# Patient Record
Sex: FEMALE | Race: WHITE | Employment: FULL TIME | URBAN - METROPOLITAN AREA
[De-identification: names, ages, dates, MRNs, and addresses within clinical notes are randomized per-mention and may not be internally consistent; named-entity substitution may affect disease eponyms.]

---

## 2019-05-07 ENCOUNTER — APPOINTMENT (OUTPATIENT)
Dept: GENERAL RADIOLOGY | Age: 34
End: 2019-05-07
Payer: COMMERCIAL

## 2019-05-07 ENCOUNTER — HOSPITAL ENCOUNTER (EMERGENCY)
Age: 34
Discharge: HOME | End: 2019-05-07
Payer: COMMERCIAL

## 2019-05-07 VITALS
HEIGHT: 61 IN | OXYGEN SATURATION: 98 % | SYSTOLIC BLOOD PRESSURE: 129 MMHG | HEART RATE: 95 BPM | BODY MASS INDEX: 26.43 KG/M2 | DIASTOLIC BLOOD PRESSURE: 82 MMHG | RESPIRATION RATE: 18 BRPM | WEIGHT: 140 LBS | TEMPERATURE: 98.2 F

## 2019-05-07 DIAGNOSIS — J40 BRONCHITIS: Primary | ICD-10-CM

## 2019-05-07 LAB
EKG ATRIAL RATE: 75 BPM
EKG DIAGNOSIS: NORMAL
EKG P AXIS: 4 DEGREES
EKG P-R INTERVAL: 152 MS
EKG Q-T INTERVAL: 384 MS
EKG QRS DURATION: 84 MS
EKG QTC CALCULATION (BAZETT): 428 MS
EKG R AXIS: 66 DEGREES
EKG T AXIS: 42 DEGREES
EKG VENTRICULAR RATE: 75 BPM

## 2019-05-07 PROCEDURE — 6370000000 HC RX 637 (ALT 250 FOR IP): Performed by: PHYSICIAN ASSISTANT

## 2019-05-07 PROCEDURE — 71046 X-RAY EXAM CHEST 2 VIEWS: CPT

## 2019-05-07 PROCEDURE — 93005 ELECTROCARDIOGRAM TRACING: CPT | Performed by: EMERGENCY MEDICINE

## 2019-05-07 PROCEDURE — 93010 ELECTROCARDIOGRAM REPORT: CPT | Performed by: INTERNAL MEDICINE

## 2019-05-07 PROCEDURE — 94640 AIRWAY INHALATION TREATMENT: CPT

## 2019-05-07 PROCEDURE — 6360000002 HC RX W HCPCS: Performed by: PHYSICIAN ASSISTANT

## 2019-05-07 PROCEDURE — 99284 EMERGENCY DEPT VISIT MOD MDM: CPT

## 2019-05-07 RX ORDER — DEXAMETHASONE 4 MG/1
10 TABLET ORAL ONCE
Status: COMPLETED | OUTPATIENT
Start: 2019-05-07 | End: 2019-05-07

## 2019-05-07 RX ORDER — METHYLPREDNISOLONE 4 MG/1
TABLET ORAL
Qty: 1 KIT | Refills: 0 | Status: SHIPPED | OUTPATIENT
Start: 2019-05-07 | End: 2019-09-13 | Stop reason: ALTCHOICE

## 2019-05-07 RX ORDER — ALBUTEROL SULFATE 90 UG/1
2 AEROSOL, METERED RESPIRATORY (INHALATION) 4 TIMES DAILY PRN
Qty: 1 INHALER | Refills: 5 | Status: SHIPPED | OUTPATIENT
Start: 2019-05-07

## 2019-05-07 RX ORDER — BENZONATATE 100 MG/1
100 CAPSULE ORAL 3 TIMES DAILY PRN
Qty: 15 CAPSULE | Refills: 0 | Status: SHIPPED | OUTPATIENT
Start: 2019-05-07 | End: 2019-05-10

## 2019-05-07 RX ORDER — IPRATROPIUM BROMIDE AND ALBUTEROL SULFATE 2.5; .5 MG/3ML; MG/3ML
1 SOLUTION RESPIRATORY (INHALATION) ONCE
Status: COMPLETED | OUTPATIENT
Start: 2019-05-07 | End: 2019-05-07

## 2019-05-07 RX ADMIN — IPRATROPIUM BROMIDE AND ALBUTEROL SULFATE 1 AMPULE: .5; 3 SOLUTION RESPIRATORY (INHALATION) at 15:57

## 2019-05-07 RX ADMIN — DEXAMETHASONE 10 MG: 4 TABLET ORAL at 16:30

## 2019-05-07 ASSESSMENT — PAIN DESCRIPTION - DESCRIPTORS: DESCRIPTORS: TIGHTNESS

## 2019-05-07 ASSESSMENT — PAIN DESCRIPTION - LOCATION: LOCATION: BACK

## 2019-05-07 ASSESSMENT — PAIN SCALES - GENERAL: PAINLEVEL_OUTOF10: 5

## 2019-05-07 ASSESSMENT — PAIN DESCRIPTION - PAIN TYPE: TYPE: ACUTE PAIN

## 2019-05-07 ASSESSMENT — PAIN DESCRIPTION - ORIENTATION: ORIENTATION: UPPER;MID

## 2019-05-07 ASSESSMENT — PAIN DESCRIPTION - FREQUENCY: FREQUENCY: CONTINUOUS

## 2019-05-07 NOTE — ED PROVIDER NOTES
[address] Emergency Department    CHIEF COMPLAINT  Shortness of Breath (hx of asthma, hurts to breathe, cough, pain between shoulder blades.  )      HISTORY OF PRESENT ILLNESS  Balta Story is a 34 y.o. female who presents to the ED complaining of one-week history of some cough and chest tightness. Patient observed lying in bed, appears nontoxic and in no acute distress at this time. Patient drove herself in today for evaluation. Patient with history of asthma. Is complaining of some chest discomfort when coughing. Cough is nonproductive. No hemoptysis. She is a nonsmoker. Denies fevers or chills. No neck, arm, jaw or back pain. Denies headache, lightheadedness, dizziness or confusion. No abdominal pain. No urinary symptoms. No unilateral calf pain or swelling. Denies exogenous estrogen use. No recent travel, trauma, or surgery. No other complaints, modifying factors or associated symptoms. Nursing notes reviewed.    Past Medical History:   Diagnosis Date    Asthma     Bleeding nose 13yo    s/p cauterization (left)    Migraines      Past Surgical History:   Procedure Laterality Date    ADENOIDECTOMY      KNEE SURGERY  1997    R knee    TYMPANOSTOMY TUBE PLACEMENT      child     Family History   Problem Relation Age of Onset    Diabetes Mother     Diabetes Father     High Blood Pressure Father      Social History     Socioeconomic History    Marital status:      Spouse name: Not on file    Number of children: Not on file    Years of education: Not on file    Highest education level: Not on file   Occupational History    Not on file   Social Needs    Financial resource strain: Not on file    Food insecurity:     Worry: Not on file     Inability: Not on file    Transportation needs:     Medical: Not on file     Non-medical: Not on file   Tobacco Use    Smoking status: Never Smoker    Smokeless tobacco: Never Used   Substance and Sexual Activity    Alcohol use: Yes     Comment: occas    Drug use: Yes     Types: Marijuana    Sexual activity: Yes     Partners: Male     Comment: uses condoms   Lifestyle    Physical activity:     Days per week: Not on file     Minutes per session: Not on file    Stress: Not on file   Relationships    Social connections:     Talks on phone: Not on file     Gets together: Not on file     Attends Faith service: Not on file     Active member of club or organization: Not on file     Attends meetings of clubs or organizations: Not on file     Relationship status: Not on file    Intimate partner violence:     Fear of current or ex partner: Not on file     Emotionally abused: Not on file     Physically abused: Not on file     Forced sexual activity: Not on file   Other Topics Concern    Not on file   Social History Narrative    Not on file     No current facility-administered medications for this encounter. Current Outpatient Medications   Medication Sig Dispense Refill    albuterol sulfate  (90 Base) MCG/ACT inhaler Inhale 2 puffs into the lungs 4 times daily as needed for Wheezing 1 Inhaler 5    methylPREDNISolone (MEDROL, SHANNON,) 4 MG tablet Take by mouth. 1 kit 0     Allergies   Allergen Reactions    Allegra [Fexofenadine Hydrochloride]      vomiting    Amoxicillin Rash    Ceclor [Cefaclor] Nausea And Vomiting       REVIEW OF SYSTEMS  10 systems reviewed, pertinent positives per HPI otherwise noted to be negative    PHYSICAL EXAM  /82   Pulse 87   Temp 98.2 °F (36.8 °C) (Oral)   Resp 16   Ht 5' 1\" (1.549 m)   Wt 140 lb (63.5 kg)   LMP 04/23/2019   SpO2 98%   BMI 26.45 kg/m²   GENERAL APPEARANCE: Awake and alert. Cooperative. No acute distress. HEAD: Normocephalic. Atraumatic. EYES: PERRL. EOM's grossly intact. ENT: Mucous membranes are moist.   NECK: Supple. No JVD. No tracheal tenderness or deviation. No crepitus. HEART: RRR. No murmurs. No chest wall tenderness.   LUNGS: Respirations unlabored. CTAB. Good air exchange. Speaking comfortably in full sentences. No obvious wheezing, rhonchi, rales. ABDOMEN: Soft. Non-distended. Non-tender. No guarding or rebound. No midline pulsatile mass. EXTREMITIES: No peripheral edema. No unilateral calf pain, redness, or swelling. Moves all extremities equally. All extremities neurovascularly intact. SKIN: Warm and dry. No acute rashes. NEUROLOGICAL: Alert and oriented. CN's 2-12 intact. No gross facial drooping. Strength 5/5, sensation intact. PSYCHIATRIC: Normal mood and affect. RADIOLOGY  Xr Chest Standard (2 Vw)    Result Date: 5/7/2019  EXAMINATION: TWO VIEWS OF THE CHEST 5/7/2019 3:04 pm COMPARISON: Chest radiograph and CT chest September 19, 2010 HISTORY: ORDERING SYSTEM PROVIDED HISTORY: sob TECHNOLOGIST PROVIDED HISTORY: Reason for exam:->sob Ordering Physician Provided Reason for Exam: SOB, some CP. Patient has asthma. Patient reports non-productive cough Acuity: Acute Type of Exam: Initial FINDINGS: The lungs are without acute focal process. There is no effusion or pneumothorax. The cardiomediastinal silhouette is without acute process. The osseous structures are without acute process. No significant change compared to prior. No acute process. ED COURSE   I have evaluated this patient. Attending physician was available for consultation. Patient received DuoNeb and decadron for pain shortness of breath, with good relief. Triage vitals are stable. Chest x-ray clear. EKG nsr w/o acute abnormalities. Feeling better on re-evaluation. Will be discharged with medications for acute ashthma exacerbation. Have discussed return precautions and recommendations for f/u and patient in agreement and comfortable at d/c. A discussion was had with Mrs. Calle regarding chest tightness, ED findings, and recommendations for f/u. All questions were answered.  Patient will follow up with  PCP w/in 2-3 days for further evaluation/treatment. Patient will return to ED for new/worsening symptoms. Patient was sent home with a prescription for Medrol dose capo, tessalon, and albuterol hfa. MDM  Results for orders placed or performed during the hospital encounter of 05/07/19   EKG 12 Lead   Result Value Ref Range    Ventricular Rate 75 BPM    Atrial Rate 75 BPM    P-R Interval 152 ms    QRS Duration 84 ms    Q-T Interval 384 ms    QTc Calculation (Bazett) 428 ms    P Axis 4 degrees    R Axis 66 degrees    T Axis 42 degrees    Diagnosis       Normal sinus rhythmNormal ECGConfirmed by Nura Lopez MD, Hosea Henao ([id number]) on 5/7/2019 8:51:02 PM     I estimate there is LOW risk for PNEUMOTHORAX, PNEUMONIA, PULMONARY EMBOLISM, ACUTE CORONARY SYNDROME, OR THORACIC AORTIC DISSECTION, thus I consider the discharge disposition reasonable. Noah Ascencio and I have discussed the diagnosis and risks, and we agree with discharging home to follow-up with their primary doctor. We also discussed returning to the Emergency Department immediately if new or worsening symptoms occur. We have discussed the symptoms which are most concerning (e.g., bloody sputum, fever, worsening pain or shortness of breath, vomiting) that necessitate immediate return. FINAL Impression  1. Bronchitis      Blood pressure 129/82, pulse 95, temperature 98.2 °F (36.8 °C), temperature source Oral, resp. rate 18, height 5' 1\" (1.549 m), weight 140 lb (63.5 kg), last menstrual period 04/23/2019, SpO2 98 %, unknown if currently breastfeeding. DISPOSITION  Patient was discharged to home in good condition.          Des Arceoma  05/09/19 [id number]

## 2019-09-13 ENCOUNTER — HOSPITAL ENCOUNTER (EMERGENCY)
Age: 34
Discharge: HOME | End: 2019-09-14
Attending: EMERGENCY MEDICINE
Payer: COMMERCIAL

## 2019-09-13 DIAGNOSIS — N93.8 DUB (DYSFUNCTIONAL UTERINE BLEEDING): Primary | ICD-10-CM

## 2019-09-13 LAB
A/G RATIO: 1.5 (ref 1.1–2.2)
ALBUMIN SERPL-MCNC: 4.1 G/DL (ref 3.4–5)
ALP BLD-CCNC: 72 U/L (ref 40–129)
ALT SERPL-CCNC: 8 U/L (ref 10–40)
ANION GAP SERPL CALCULATED.3IONS-SCNC: 11 MMOL/L (ref 3–16)
AST SERPL-CCNC: 16 U/L (ref 15–37)
BASOPHILS ABSOLUTE: 0.1 K/UL (ref 0–0.2)
BASOPHILS RELATIVE PERCENT: 1 %
BILIRUB SERPL-MCNC: <0.2 MG/DL (ref 0–1)
BILIRUBIN URINE: NEGATIVE
BLOOD, URINE: ABNORMAL
BUN BLDV-MCNC: 10 MG/DL (ref 7–20)
CALCIUM SERPL-MCNC: 8.7 MG/DL (ref 8.3–10.6)
CHLORIDE BLD-SCNC: 104 MMOL/L (ref 99–110)
CLARITY: CLEAR
CO2: 21 MMOL/L (ref 21–32)
COLOR: YELLOW
CREAT SERPL-MCNC: 0.8 MG/DL (ref 0.6–1.1)
EOSINOPHILS ABSOLUTE: 0.6 K/UL (ref 0–0.6)
EOSINOPHILS RELATIVE PERCENT: 6 %
GFR AFRICAN AMERICAN: >60
GFR NON-AFRICAN AMERICAN: >60
GLOBULIN: 2.7 G/DL
GLUCOSE BLD-MCNC: 162 MG/DL (ref 70–99)
GLUCOSE URINE: NEGATIVE MG/DL
HCG(URINE) PREGNANCY TEST: NEGATIVE
HCT VFR BLD CALC: 38.6 % (ref 36–48)
HEMATOLOGY PATH CONSULT: YES
HEMOGLOBIN: 13.1 G/DL (ref 12–16)
KETONES, URINE: NEGATIVE MG/DL
LEUKOCYTE ESTERASE, URINE: NEGATIVE
LYMPHOCYTES ABSOLUTE: 6.5 K/UL (ref 1–5.1)
LYMPHOCYTES RELATIVE PERCENT: 61 %
MCH RBC QN AUTO: 33.1 PG (ref 26–34)
MCHC RBC AUTO-ENTMCNC: 34.1 G/DL (ref 31–36)
MCV RBC AUTO: 97.1 FL (ref 80–100)
MICROSCOPIC EXAMINATION: YES
MONOCYTES ABSOLUTE: 0.3 K/UL (ref 0–1.3)
MONOCYTES RELATIVE PERCENT: 3 %
MUCUS: ABNORMAL /LPF
NEUTROPHILS ABSOLUTE: 3.1 K/UL (ref 1.7–7.7)
NEUTROPHILS RELATIVE PERCENT: 29 %
NITRITE, URINE: NEGATIVE
PDW BLD-RTO: 12.1 % (ref 12.4–15.4)
PH UA: 6 (ref 5–8)
PLATELET # BLD: 217 K/UL (ref 135–450)
PMV BLD AUTO: 8.3 FL (ref 5–10.5)
POTASSIUM SERPL-SCNC: 3.9 MMOL/L (ref 3.5–5.1)
PROTEIN UA: ABNORMAL MG/DL
RBC # BLD: 3.97 M/UL (ref 4–5.2)
RBC UA: ABNORMAL /HPF (ref 0–2)
SLIDE REVIEW: ABNORMAL
SODIUM BLD-SCNC: 136 MMOL/L (ref 136–145)
SPECIFIC GRAVITY UA: 1.02 (ref 1–1.03)
TOTAL PROTEIN: 6.8 G/DL (ref 6.4–8.2)
URINE TYPE: ABNORMAL
UROBILINOGEN, URINE: 0.2 E.U./DL
WBC # BLD: 10.7 K/UL (ref 4–11)
WBC UA: ABNORMAL /HPF (ref 0–5)

## 2019-09-13 PROCEDURE — 81001 URINALYSIS AUTO W/SCOPE: CPT

## 2019-09-13 PROCEDURE — 96374 THER/PROPH/DIAG INJ IV PUSH: CPT

## 2019-09-13 PROCEDURE — 80053 COMPREHEN METABOLIC PANEL: CPT

## 2019-09-13 PROCEDURE — 99284 EMERGENCY DEPT VISIT MOD MDM: CPT

## 2019-09-13 PROCEDURE — 96375 TX/PRO/DX INJ NEW DRUG ADDON: CPT

## 2019-09-13 PROCEDURE — 96361 HYDRATE IV INFUSION ADD-ON: CPT

## 2019-09-13 PROCEDURE — 85025 COMPLETE CBC W/AUTO DIFF WBC: CPT

## 2019-09-13 PROCEDURE — 84703 CHORIONIC GONADOTROPIN ASSAY: CPT

## 2019-09-13 PROCEDURE — 2580000003 HC RX 258: Performed by: EMERGENCY MEDICINE

## 2019-09-13 PROCEDURE — 6360000002 HC RX W HCPCS: Performed by: EMERGENCY MEDICINE

## 2019-09-13 RX ORDER — KETOROLAC TROMETHAMINE 30 MG/ML
30 INJECTION, SOLUTION INTRAMUSCULAR; INTRAVENOUS ONCE
Status: COMPLETED | OUTPATIENT
Start: 2019-09-13 | End: 2019-09-13

## 2019-09-13 RX ORDER — 0.9 % SODIUM CHLORIDE 0.9 %
1000 INTRAVENOUS SOLUTION INTRAVENOUS ONCE
Status: COMPLETED | OUTPATIENT
Start: 2019-09-13 | End: 2019-09-13

## 2019-09-13 RX ORDER — ONDANSETRON 2 MG/ML
4 INJECTION INTRAMUSCULAR; INTRAVENOUS ONCE
Status: COMPLETED | OUTPATIENT
Start: 2019-09-13 | End: 2019-09-13

## 2019-09-13 RX ADMIN — KETOROLAC TROMETHAMINE 30 MG: 30 INJECTION, SOLUTION INTRAMUSCULAR at 22:30

## 2019-09-13 RX ADMIN — SODIUM CHLORIDE 1000 ML: 9 INJECTION, SOLUTION INTRAVENOUS at 22:30

## 2019-09-13 RX ADMIN — ONDANSETRON 4 MG: 2 INJECTION INTRAMUSCULAR; INTRAVENOUS at 22:30

## 2019-09-13 ASSESSMENT — PAIN SCALES - GENERAL
PAINLEVEL_OUTOF10: 7
PAINLEVEL_OUTOF10: 3

## 2019-09-13 ASSESSMENT — PAIN DESCRIPTION - PAIN TYPE: TYPE: ACUTE PAIN

## 2019-09-13 ASSESSMENT — PAIN DESCRIPTION - DESCRIPTORS: DESCRIPTORS: CRAMPING

## 2019-09-14 VITALS
HEART RATE: 78 BPM | RESPIRATION RATE: 18 BRPM | BODY MASS INDEX: 26.43 KG/M2 | DIASTOLIC BLOOD PRESSURE: 80 MMHG | TEMPERATURE: 98.3 F | WEIGHT: 140 LBS | HEIGHT: 61 IN | OXYGEN SATURATION: 97 % | SYSTOLIC BLOOD PRESSURE: 145 MMHG

## 2019-09-14 LAB — SPECIMEN STATUS: NORMAL

## 2019-09-16 LAB — HEMATOLOGY PATH CONSULT: NORMAL

## 2019-09-23 NOTE — ED PROVIDER NOTES
errors. If there are any questions or concerns please feel free to contact the dictating provider for clarification.      Meghna Jarquin,   ATTENDING, [id number] [place] [address], DO  09/23/19 [id number]

## 2020-02-07 ENCOUNTER — TELEPHONE (OUTPATIENT)
Dept: FAMILY MEDICINE CLINIC | Age: 35
End: 2020-02-07

## 2020-02-07 NOTE — TELEPHONE ENCOUNTER
Patient would like to become a patient of yours. She used to come here up until 2011 (it looks like). No controlled, asthma, [employer].

## 2020-02-14 ENCOUNTER — OFFICE VISIT (OUTPATIENT)
Dept: FAMILY MEDICINE CLINIC | Age: 35
End: 2020-02-14
Payer: COMMERCIAL

## 2020-02-14 VITALS
WEIGHT: 154.6 LBS | HEIGHT: 62 IN | SYSTOLIC BLOOD PRESSURE: 108 MMHG | DIASTOLIC BLOOD PRESSURE: 70 MMHG | BODY MASS INDEX: 28.45 KG/M2 | HEART RATE: 72 BPM | OXYGEN SATURATION: 98 %

## 2020-02-14 LAB
A/G RATIO: 2 (ref 1.1–2.2)
ALBUMIN SERPL-MCNC: 4.6 G/DL (ref 3.4–5)
ALP BLD-CCNC: 76 U/L (ref 40–129)
ALT SERPL-CCNC: 8 U/L (ref 10–40)
ANION GAP SERPL CALCULATED.3IONS-SCNC: 13 MMOL/L (ref 3–16)
AST SERPL-CCNC: 13 U/L (ref 15–37)
BILIRUB SERPL-MCNC: 0.6 MG/DL (ref 0–1)
BUN BLDV-MCNC: 10 MG/DL (ref 7–20)
CALCIUM SERPL-MCNC: 9 MG/DL (ref 8.3–10.6)
CHLORIDE BLD-SCNC: 102 MMOL/L (ref 99–110)
CHOLESTEROL, TOTAL: 132 MG/DL (ref 0–199)
CO2: 26 MMOL/L (ref 21–32)
CREAT SERPL-MCNC: 0.8 MG/DL (ref 0.6–1.1)
GFR AFRICAN AMERICAN: >60
GFR NON-AFRICAN AMERICAN: >60
GLOBULIN: 2.3 G/DL
GLUCOSE BLD-MCNC: 101 MG/DL (ref 70–99)
HCT VFR BLD CALC: 40.9 % (ref 36–48)
HDLC SERPL-MCNC: 59 MG/DL (ref 40–60)
HEMOGLOBIN: 14.1 G/DL (ref 12–16)
LDL CHOLESTEROL CALCULATED: 58 MG/DL
MCH RBC QN AUTO: 33.2 PG (ref 26–34)
MCHC RBC AUTO-ENTMCNC: 34.5 G/DL (ref 31–36)
MCV RBC AUTO: 96.2 FL (ref 80–100)
PDW BLD-RTO: 12.4 % (ref 12.4–15.4)
PLATELET # BLD: 160 K/UL (ref 135–450)
PMV BLD AUTO: 9 FL (ref 5–10.5)
POTASSIUM SERPL-SCNC: 3.5 MMOL/L (ref 3.5–5.1)
RBC # BLD: 4.25 M/UL (ref 4–5.2)
SODIUM BLD-SCNC: 141 MMOL/L (ref 136–145)
TOTAL PROTEIN: 6.9 G/DL (ref 6.4–8.2)
TRIGL SERPL-MCNC: 76 MG/DL (ref 0–150)
TSH SERPL DL<=0.05 MIU/L-ACNC: 0.57 UIU/ML (ref 0.27–4.2)
VLDLC SERPL CALC-MCNC: 15 MG/DL
WBC # BLD: 6.3 K/UL (ref 4–11)

## 2020-02-14 PROCEDURE — G8484 FLU IMMUNIZE NO ADMIN: HCPCS | Performed by: PHYSICIAN ASSISTANT

## 2020-02-14 PROCEDURE — 36415 COLL VENOUS BLD VENIPUNCTURE: CPT | Performed by: PHYSICIAN ASSISTANT

## 2020-02-14 PROCEDURE — 99385 PREV VISIT NEW AGE 18-39: CPT | Performed by: PHYSICIAN ASSISTANT

## 2020-02-14 ASSESSMENT — PATIENT HEALTH QUESTIONNAIRE - PHQ9
2. FEELING DOWN, DEPRESSED OR HOPELESS: 1
1. LITTLE INTEREST OR PLEASURE IN DOING THINGS: 0
SUM OF ALL RESPONSES TO PHQ QUESTIONS 1-9: 1
SUM OF ALL RESPONSES TO PHQ QUESTIONS 1-9: 1
SUM OF ALL RESPONSES TO PHQ9 QUESTIONS 1 & 2: 1

## 2020-02-14 NOTE — PROGRESS NOTES
History and Physical      Richardson Luis  YOB: 1985    Date of Service:  2/14/2020    Chief Complaint:   Richardson Luis is a 30 y.o. female who presents for complete physical examination. HPI: Is having stomach issus for years. Is having pain in the upper abdomen. 1-2 times a month will have vomiting with it. Indigestion and gas issues. Has used gas-x with no real change. Has alternating bowel issues constipation/diarrhea. No blood in the stool. Has used miralax and otc laxatives which have been ineffective or does not tolerate. Symptoms seem to be worse with foot especially certain foods. Meats feel like they get stuck in her throat. Wt Readings from Last 3 Encounters:   02/14/20 154 lb 9.6 oz (70.1 kg)   09/13/19 140 lb (63.5 kg)   05/07/19 140 lb (63.5 kg)     BP Readings from Last 3 Encounters:   02/14/20 108/70   09/14/19 (!) 145/80   05/07/19 129/82       There is no problem list on file for this patient.       Preventive Care:  Health Maintenance   Topic Date Due    Varicella vaccine (1 of 2 - 2-dose childhood series) 11/08/1988    HIV screen  11/08/2002    Cervical cancer screen  11/08/2008    Flu vaccine (1) 09/01/2019    DTaP/Tdap/Td vaccine (6 - Td) 02/11/2021    Shingles Vaccine (1 of 2) 11/08/2037    Hepatitis B vaccine  Completed    Hib vaccine  Completed    Hepatitis A vaccine  Aged Out    Meningococcal (ACWY) vaccine  Aged Out    Pneumococcal 0-64 years Vaccine  Aged Out     Hx abnormal PAP: no  Sexual activity: single partner  Self-breast exams: yes  Last eye exam: 2 years ago- wears glasses  Exercise: yes  Seatbelt use: yes  Calcium/vitamin D supplement: no      Immunization History   Administered Date(s) Administered    DTaP 01/08/1988, 02/08/1988, 03/02/1988, 07/06/1989, 05/15/1993    Hepatitis B 08/27/2001, 09/25/2001, 08/09/2002    Hib, unspecified 07/06/1989    Influenza 02/11/2011    MMR 03/04/1989, 08/09/2000    PPD Test 02/11/2011    Polio IPV (IPOL) 01/08/1988, 03/02/1988, 07/06/1989, 05/15/1993    Td, unspecified formulation 08/09/2002    Tdap (Boostrix, Adacel) 02/11/2011       Allergies   Allergen Reactions    Allegra [Fexofenadine Hydrochloride]      vomiting    Amoxicillin Rash    Ceclor [Cefaclor] Nausea And Vomiting     Current Outpatient Medications   Medication Sig Dispense Refill    albuterol sulfate  (90 Base) MCG/ACT inhaler Inhale 2 puffs into the lungs 4 times daily as needed for Wheezing 1 Inhaler 5     No current facility-administered medications for this visit. Past Medical History:   Diagnosis Date    Asthma     Bleeding nose 13yo    s/p cauterization (left)    Migraines      Past Surgical History:   Procedure Laterality Date    ADENOIDECTOMY      KNEE SURGERY  1997    R knee    TYMPANOSTOMY TUBE PLACEMENT      child     Family History   Problem Relation Age of Onset   China Thompson Diabetes Mother     Other Mother         thyroid    High Blood Pressure Mother     Diabetes Father     High Blood Pressure Father     Cancer Sister         stomach/throat unclear    Other Sister      Social History     Socioeconomic History    Marital status:      Spouse name: Not on file    Number of children: Not on file    Years of education: Not on file    Highest education level: Not on file   Occupational History    Not on file   Social Needs    Financial resource strain: Not on file    Food insecurity:     Worry: Not on file     Inability: Not on file    Transportation needs:     Medical: Not on file     Non-medical: Not on file   Tobacco Use    Smoking status: Never Smoker    Smokeless tobacco: Never Used   Substance and Sexual Activity    Alcohol use:  Yes     Alcohol/week: 1.0 standard drinks     Types: 1 Cans of beer per week     Comment: 1 beer a week     Drug use: Yes     Types: Marijuana    Sexual activity: Yes     Partners: Male   Lifestyle    Physical activity:     Days per week: Not on file     Minutes per session: Not on file    Stress: Not on file   Relationships    Social connections:     Talks on phone: Not on file     Gets together: Not on file     Attends [employer] service: Not on file     Active member of club or organization: Not on file     Attends meetings of clubs or organizations: Not on file     Relationship status: Not on file    Intimate partner violence:     Fear of current or ex partner: Not on file     Emotionally abused: Not on file     Physically abused: Not on file     Forced sexual activity: Not on file   Other Topics Concern    Not on file   Social History Narrative    Not on file       Review of Systems:  A comprehensive review of systems was negative except for what was noted in the HPI. Physical Exam:   Vitals:    02/14/20 0900   BP: 108/70   Site: Right Upper Arm   Position: Sitting   Pulse: 72   SpO2: 98%   Weight: 154 lb 9.6 oz (70.1 kg)   Height: 5' 1.9\" (1.572 m)     Body mass index is 28.37 kg/m². Constitutional: She is oriented to person, place, and time. She appears well-developed and well-nourished. No distress. HEENT:   Head: Normocephalic and atraumatic. Right Ear: Tympanic membrane, external ear and ear canal normal.   Left Ear: Tympanic membrane, external ear and ear canal normal.   Nose: Nose normal.   Mouth/Throat: Oropharynx is clear and moist, and mucous membranes are normal.  There is no cervical adenopathy. Eyes: Conjunctivae and extraocular motions are normal. Pupils are equal, round, and reactive to light. Neck: Neck supple. No JVD present. Carotid bruit is not present. No mass and no thyromegaly present. Cardiovascular: Normal rate, regular rhythm, normal heart sounds and intact distal pulses. Exam reveals no gallop and no friction rub. No murmur heard. Pulmonary/Chest: Effort normal and breath sounds normal. No respiratory distress. She has no wheezes, rhonchi or rales. Abdominal: Soft, non-tender.  Bowel sounds and aorta are normal. She exhibits

## 2020-02-15 LAB
ESTIMATED AVERAGE GLUCOSE: 96.8 MG/DL
HBA1C MFR BLD: 5 %

## 2020-02-17 ENCOUNTER — HOSPITAL ENCOUNTER (OUTPATIENT)
Dept: ULTRASOUND IMAGING | Age: 35
Discharge: HOME | End: 2020-02-17
Payer: COMMERCIAL

## 2020-02-17 PROCEDURE — 76705 ECHO EXAM OF ABDOMEN: CPT

## 2020-03-19 ENCOUNTER — TELEPHONE (OUTPATIENT)
Dept: FAMILY MEDICINE CLINIC | Age: 35
End: 2020-03-19

## 2020-03-19 RX ORDER — PREDNISONE 20 MG/1
40 TABLET ORAL DAILY
Qty: 8 TABLET | Refills: 0 | Status: SHIPPED | OUTPATIENT
Start: 2020-03-19 | End: 2020-03-23

## 2020-03-19 NOTE — TELEPHONE ENCOUNTER
Patient called stating she has asthma and her rescue inhaler is not helping with the wheezing she is having. She would like to know if there is a different inhaler or something that would help with the wheezing? Last seen 2-14-20. Garcia, [address] [place] [address]. Christophe Menard is out of the office until tomorrow.

## 2020-03-19 NOTE — TELEPHONE ENCOUNTER
Call pt back: Steroids for 3 days will be prescribed. Continue use of inhaler. Add allergy med like Claritan to control the allergens.

## 2021-07-13 ENCOUNTER — APPOINTMENT (OUTPATIENT)
Dept: GENERAL RADIOLOGY | Age: 36
End: 2021-07-13
Payer: COMMERCIAL

## 2021-07-13 ENCOUNTER — HOSPITAL ENCOUNTER (EMERGENCY)
Age: 36
Discharge: HOME | End: 2021-07-13
Attending: EMERGENCY MEDICINE
Payer: COMMERCIAL

## 2021-07-13 VITALS
DIASTOLIC BLOOD PRESSURE: 73 MMHG | OXYGEN SATURATION: 100 % | TEMPERATURE: 97.8 F | WEIGHT: 160 LBS | BODY MASS INDEX: 30.21 KG/M2 | HEIGHT: 61 IN | HEART RATE: 66 BPM | RESPIRATION RATE: 12 BRPM | SYSTOLIC BLOOD PRESSURE: 121 MMHG

## 2021-07-13 DIAGNOSIS — S89.91XA INJURY OF RIGHT KNEE, INITIAL ENCOUNTER: ICD-10-CM

## 2021-07-13 DIAGNOSIS — M25.461 EFFUSION OF RIGHT KNEE JOINT: Primary | ICD-10-CM

## 2021-07-13 PROCEDURE — 99284 EMERGENCY DEPT VISIT MOD MDM: CPT

## 2021-07-13 PROCEDURE — 73562 X-RAY EXAM OF KNEE 3: CPT

## 2021-07-13 RX ORDER — NAPROXEN 500 MG/1
500 TABLET ORAL 2 TIMES DAILY PRN
Qty: 20 TABLET | Refills: 0 | Status: SHIPPED | OUTPATIENT
Start: 2021-07-13 | End: 2022-04-18

## 2021-07-13 RX ORDER — ACETAMINOPHEN 500 MG
500 TABLET ORAL EVERY 6 HOURS PRN
Qty: 28 TABLET | Refills: 0 | Status: SHIPPED | OUTPATIENT
Start: 2021-07-13 | End: 2022-07-06

## 2021-07-13 ASSESSMENT — PAIN SCALES - GENERAL: PAINLEVEL_OUTOF10: 7

## 2021-07-13 ASSESSMENT — PAIN DESCRIPTION - FREQUENCY: FREQUENCY: CONTINUOUS

## 2021-07-13 ASSESSMENT — PAIN DESCRIPTION - PAIN TYPE: TYPE: ACUTE PAIN

## 2021-07-13 ASSESSMENT — PAIN DESCRIPTION - ORIENTATION: ORIENTATION: RIGHT

## 2021-07-13 ASSESSMENT — PAIN DESCRIPTION - LOCATION: LOCATION: KNEE

## 2021-07-14 NOTE — ED PROVIDER NOTES
Emergency Physician Note  [address] ED  [address] [place] [address]  Dept: [phone]  Loc: [phone]  Open Note Time:  11:04 PM EDT    Chief Complaint  Knee Pain (Pt comes in with c/o right knee pain. Pt reports she was playing sand volleyball sunday and it started hurting a little at that time but she was able to continue playing. Pt reports today, after a couple days of working on it, the right knee is swollen and more painful. Right knee appears a little larger than left. no deformity or bruising noted. )     History of Present Illness  Cade Collins is a 37 y.o. female  has a past medical history of Asthma, Bleeding nose, and Migraines. who presents to the ED for right knee pain. Patient states she did play volleyball on Sunday. She did not notice any pain while playing volleyball however shortly thereafter she started noticing increasing pain and swelling of the right knee. She states that she feels some pain on the right medial knee and then she started to feel pressure on the lateral.  Does not recall any direct trauma to her knee. She states that this time sometimes it feels a little better when she flexes her knee. She definitely has pain with ambulation. She has tried ibuprofen with minimal relief. She has had surgery on that knee in the remote past.  Denies any lower extremity edema. Denies fever,  chest pain, shortness of breath, cough, abdominal pain, nausea, vomiting, diarrhea, headache, sore throat,  back pain, rash. No palliative/provocative factors.        Unless otherwise stated in this report or unable to obtain because of the patient's clinical or mental status as evidenced by the medical record, this patient's positive and negative responses for review of systems, constitutional, psych, eyes, ENT, cardiovascular, respiratory, gastrointestinal, neurological, genitourinary, musculoskeletal, integument systems and systems related to the presenting problem are either stated in the preceding paragraph or were not pertinent or were negative for the symptoms and/or complaints related to the medical problem. I have reviewed the following from the nursing documentation:      Prior to Admission medications    Medication Sig Start Date End Date Taking? Authorizing Provider   albuterol sulfate  (90 Base) MCG/ACT inhaler Inhale 2 puffs into the lungs 4 times daily as needed for Wheezing 5/7/19   CHARO Blue       Allergies as of 07/13/2021 - Fully Reviewed 07/13/2021   Allergen Reaction Noted    Allegra [fexofenadine hydrochloride]  02/11/2011    Amoxicillin Rash 02/11/2011    Ceclor [cefaclor] Nausea And Vomiting 02/11/2011       Past Medical History:   Diagnosis Date    Asthma     Bleeding nose 15yo    s/p cauterization (left)    Migraines         Surgical History:   Past Surgical History:   Procedure Laterality Date    ADENOIDECTOMY      KNEE SURGERY  1997    R knee    TYMPANOSTOMY TUBE PLACEMENT      child        Family History:    Family History   Problem Relation Age of Onset    Diabetes Mother     Other Mother         thyroid    High Blood Pressure Mother     Diabetes Father     High Blood Pressure Father     Cancer Sister         stomach/throat unclear    Other Sister        Social History     Socioeconomic History    Marital status:      Spouse name: Not on file    Number of children: Not on file    Years of education: Not on file    Highest education level: Not on file   Occupational History    Not on file   Tobacco Use    Smoking status: Never Smoker    Smokeless tobacco: Never Used   Vaping Use    Vaping Use: Never used   Substance and Sexual Activity    Alcohol use:  Yes     Alcohol/week: 1.0 standard drinks     Types: 1 Cans of beer per week     Comment: 1 beer a week     Drug use: Yes     Types: Marijuana    Sexual activity: Yes     Partners: Male   Other Topics Concern    Not on file Social History Narrative    Not on file     Social Determinants of Health     Financial Resource Strain:     Difficulty of Paying Living Expenses:    Food Insecurity:     Worried About Running Out of Food in the Last Year:     [address] in the Last Year:    Transportation Needs:     Lack of Transportation (Medical):  Lack of Transportation (Non-Medical):    Physical Activity:     Days of Exercise per Week:     Minutes of Exercise per Session:    Stress:     Feeling of Stress :    Social Connections:     Frequency of Communication with Friends and Family:     Frequency of Social Gatherings with Friends and Family:     Attends [employer] Services:     Active Member of Clubs or Organizations:     Attends Club or Organization Meetings:     Marital Status:    Intimate Partner Violence:     Fear of Current or Ex-Partner:     Emotionally Abused:     Physically Abused:     Sexually Abused:        Nursing notes reviewed. ED Triage Vitals [07/13/21 2046]   Enc Vitals Group      /70      Pulse 62      Resp 12      Temp 97.8 °F (36.6 °C)      Temp Source Oral      SpO2 99 %      Weight 160 lb (72.6 kg)      Height 5' 1\" (1.549 m)      Head Circumference       Peak Flow       Pain Score       Pain Loc       Pain Edu? Excl. in [address]? GENERAL:   Body mass index is 30.23 kg/m². Awake, alert. Well developed, well nourished with no apparent distress. Nontoxic-appearing, non-ill-appearing. HENT:   Normocephalic, Atraumatic, no lacerations. No ENT exam due to PPE. EYES:   Conjunctiva normal,   Pupils equal round and reactive to light,   Extraocular movements normal.  NECK:  Trachea is midline. No stridor. CHEST:  Regular rate and regular rhythm, no murmurs/rubs/gallops,  normal S1/S2, chest wall non-tender. LUNGS:  No respiratory distress.   No abdominal retractions, no sternal retractions  Clear to auscultation bilaterally, no wheezing, no rhochi, no rales  Speaking comfortably in full sentences  ABDOMEN:  Soft, non-tender, non-distended. No guarding. No rebound. No costovertebral angle tenderness to palpation. Normal BS, no organomegaly, no abdominal masses  EXTREMITIES:  Moves extremities x4 with purpose. Normal range of motion, no edema,  no deformity,  distal pulses present and equal bilaterally. Right knee: Rocio Valerio She has full range of flexion extension of the right knee, the right knee does have a palpable effusion, however the knee itself is not warm to touch there is no erythema, there is no induration and no crepitus and no ecchymosis. Posterior/anterior drawer test are negative. She does have pain with both varus and valgus stress. All compartments in her right lower legs are soft  BACK:  No midline tenderness in the cervical, thoracic, and lumbar spine. No deformities, no step-off. SKIN:  Warm, dry and intact. NEUROLOGIC:  Normal mental status. Moving all extremities to command. Alert and oriented x4  without focal motor deficit or gross sensory deficit. Normal speech. PSYCHIATRIC:  Not anxious,  normal mood and affect,  Appropriate eye contact,  thoughts are linear and organized,  without delusions/hallucinations,  Not responding to internal stimuli,  responds appropriately to questions    LABS and DIAGNOSTIC RESULTS    RADIOLOGY  X-RAYS:  I have reviewed radiologic plain film image(s). ALL OTHER NON-PLAIN FILM IMAGES SUCH AS CT, ULTRASOUND AND MRI HAVE BEEN READ BY THE RADIOLOGIST. XR KNEE RIGHT (3 VIEWS)   Final Result   No acute osseous injury is apparent. Moderate-sized joint effusion. Occult fracture or ligamentous injury are   considerations. Orthopedic follow-up is recommended. Osteoarthritis in the patellofemoral and lateral compartments. LABS  No results found for this visit on 07/13/21.     SCREENINGS  NIH Score     [facility]     [facility]    Heart Score       PROCEDURES    MEDICAL DECISION MAKING    Procedures/interventions/images ordered for this visit  Orders Placed This Encounter   Procedures    XR KNEE RIGHT (3 VIEWS)    [employer] Knee Immobilizer, Right; 16\"; Crutches; Pair, Right Side Injury; Youth ( 4'6\" -5'2\")       Medications ordered for this visit  No orders of the defined types were placed in this encounter. ED course notes for this visit       I wore surgical mask and gloves when I evaluated the patient. I evaluated the patient in room T1-3/T2      Gabriela Short is a 37 y.o. female  has a past medical history of Asthma, Bleeding nose, and Migraines. with stable vital signs . I ordered knee x-ray  I interpreted images    Based on above studies,  patient is safe for D/C. This is a very pleasant patient with knee pain. Patient denies any trauma or recent injuries. Vitals have been reviewed and are stable; patient is afebrile and nontoxic appearing. As the joint is not warm, erythematous, stiff, or significantly swollen, I do not think this is a septic joint. On exam there is also no significant evidence for a fracture, dislocation, compartment syndrome, neurovascular compromise, obvious ligament, tendon or soft tissue injury, baker's cyst, soft tissue infection, vascular concern such as a DVT, or any other process requiring immediate surgical intervention or additional testing at this time. I have recommended close follow-up specifically with an orthopedist for additional testing and consultation. Possibly an MRI may be indicated in the outpatient setting. It is understood that if the patient is not improving as expected or if other new symptoms or signs of concern develop, other etiologies or diagnoses may need to be considered requiring other tests, treatments, consultations, and/or admission. The diagnosis, plan, expected course, follow-up, and return precautions were discussed and all questions were answered. Final Impression    1. Effusion of right knee joint    2. Injury of right knee, initial encounter        Blood pressure 121/73, pulse 66, temperature 97.8 °F (36.6 °C), temperature source Oral, resp. rate 12, height 5' 1\" (1.549 m), weight 160 lb (72.6 kg), SpO2 100 %, not currently breastfeeding. Medication Summary  Patient was given scripts for the following medications. I counseled patient how to take these medications. Discharge Medication List as of 7/13/2021 11:42 PM      START taking these medications    Details   naproxen (NAPROSYN) 500 MG tablet Take 1 tablet by mouth 2 times daily as needed for Pain, Disp-20 tablet, R-0Print      acetaminophen (TYLENOL) 500 MG tablet Take 1 tablet by mouth every 6 hours as needed for Pain, Disp-28 tablet, R-0Print             Patient had scripts modified or refilled for the following medications. I counseled patient how to take these medications. Discharge Medication List as of 7/13/2021 11:42 PM          Patient had scripts discontinues for the following medications. I counseled patient to stop taking these medications. Discharge Medication List as of 7/13/2021 11:42 PM            Disposition  At this point I do not feel the patient requires further work up and it is reasonable to discharge the patient. I had a discussion with the patient and/or their surrogate regarding diagnosis, diagnostic testing results, treatment/ plan of care, and follow up. Patient and/or companions verbalized understanding of the ED workup, any relevant findings as well as any incidental findings, and the disposition and plan. There was shared decision-making between myself as well as the patient and/or their surrogate and we are all in agreement with discharge home. Naz Brennan was an opportunity for questions and all questions were answered to the best of my ability and to the satisfaction of the patient and/or patient's family. Patient agreed to follow up as recommend for further evaluation/treatment.  The patient was given strict return precautions as we discussed symptoms that would necessitate return to the ED. Patient will return to ED for new/worsening symptoms. The patient verbalized their understanding and agreement with the above plan. Please refer to AVS for further details regarding discharge instructions. Pt is in stable condition upon Discharge to home. The note was completed using Dragon voice recognition transcription. Every effort was made to ensure accuracy; however, inadvertent transcription errors may be present despite my best efforts to edit errors.     Maia Cook MD  [facility]        Maia Cook MD  07/14/21 4122

## 2021-07-14 NOTE — ED NOTES
Applied knee immoblizer and gave crutches to patient. Patient demonstrated the crutches with no problem. Patient understood the knee immoblizer instructions with no problem as well.       Frances Ramírez  67/41/29 [id number]

## 2021-07-22 ENCOUNTER — OFFICE VISIT (OUTPATIENT)
Dept: ORTHOPEDIC SURGERY | Age: 36
End: 2021-07-22
Payer: COMMERCIAL

## 2021-07-22 VITALS — BODY MASS INDEX: 30.21 KG/M2 | WEIGHT: 160 LBS | HEIGHT: 61 IN

## 2021-07-22 DIAGNOSIS — M25.561 RIGHT KNEE PAIN, UNSPECIFIED CHRONICITY: Primary | ICD-10-CM

## 2021-07-22 DIAGNOSIS — M93.261 OSTEOCHONDRITIS DISSECANS OF RIGHT KNEE: ICD-10-CM

## 2021-07-22 DIAGNOSIS — M95.8 OSTEOCHONDRAL DEFECT OF CONDYLE OF FEMUR: ICD-10-CM

## 2021-07-22 PROCEDURE — 20611 DRAIN/INJ JOINT/BURSA W/US: CPT | Performed by: ORTHOPAEDIC SURGERY

## 2021-07-22 PROCEDURE — G8427 DOCREV CUR MEDS BY ELIG CLIN: HCPCS | Performed by: ORTHOPAEDIC SURGERY

## 2021-07-22 PROCEDURE — 1036F TOBACCO NON-USER: CPT | Performed by: ORTHOPAEDIC SURGERY

## 2021-07-22 PROCEDURE — G8417 CALC BMI ABV UP PARAM F/U: HCPCS | Performed by: ORTHOPAEDIC SURGERY

## 2021-07-22 PROCEDURE — 99214 OFFICE O/P EST MOD 30 MIN: CPT | Performed by: ORTHOPAEDIC SURGERY

## 2021-07-22 RX ORDER — LIDOCAINE HYDROCHLORIDE 10 MG/ML
40 INJECTION, SOLUTION INFILTRATION; PERINEURAL ONCE
Status: COMPLETED | OUTPATIENT
Start: 2021-07-22 | End: 2021-07-22

## 2021-07-22 RX ORDER — TRIAMCINOLONE ACETONIDE 40 MG/ML
80 INJECTION, SUSPENSION INTRA-ARTICULAR; INTRAMUSCULAR ONCE
Status: COMPLETED | OUTPATIENT
Start: 2021-07-22 | End: 2021-07-22

## 2021-07-22 RX ORDER — BUPIVACAINE HYDROCHLORIDE 2.5 MG/ML
10 INJECTION, SOLUTION INFILTRATION; PERINEURAL ONCE
Status: COMPLETED | OUTPATIENT
Start: 2021-07-22 | End: 2021-07-22

## 2021-07-22 RX ADMIN — LIDOCAINE HYDROCHLORIDE 40 MG: 10 INJECTION, SOLUTION INFILTRATION; PERINEURAL at 08:55

## 2021-07-22 RX ADMIN — BUPIVACAINE HYDROCHLORIDE 10 MG: 2.5 INJECTION, SOLUTION INFILTRATION; PERINEURAL at 08:55

## 2021-07-22 RX ADMIN — TRIAMCINOLONE ACETONIDE 80 MG: 40 INJECTION, SUSPENSION INTRA-ARTICULAR; INTRAMUSCULAR at 08:55

## 2021-07-22 NOTE — PROGRESS NOTES
Dr Dar Marley      Date /Time 7/22/2021       8:44 AM EDT  Name [address]             1985   Location  [facility]  MRN [id number]                Chief Complaint   Patient presents with    Knee Pain     RIGHT KNEE         History of Present Illness  [address] is a 37 y.o. female who presents with  right knee pain,. Sent in consultation by CHARO Crowe,. Occupation: Childcare  Occupational activities: light lifting. Athletic/exercise activity: volleyball. Injury Mechanism:  none. Worker's Comp. & legal issues:   none. Patient presents the office today for a new problem. Patient is here with a chief complaint of right knee pain. Patient's right knee has been painful off and on for many years. She has had symptoms for at least 7. As a child she did have surgery for an osteochondral lesion of her lateral femoral condyle. She did well for period of time. Her knee became more symptomatic approximately a week ago without new injury or trauma. It is global knee pain with swelling. She went to the emergency room and was placed into a knee immobilizer. Past History  Past Medical History:   Diagnosis Date    Asthma     Bleeding nose 15yo    s/p cauterization (left)    Migraines      Past Surgical History:   Procedure Laterality Date    ADENOIDECTOMY      KNEE SURGERY  1997    R knee    TYMPANOSTOMY TUBE PLACEMENT      child     Social History     Tobacco Use    Smoking status: Never Smoker    Smokeless tobacco: Never Used   Substance Use Topics    Alcohol use:  Yes     Alcohol/week: 1.0 standard drinks     Types: 1 Cans of beer per week     Comment: 1 beer a week       Current Outpatient Medications on File Prior to Visit   Medication Sig Dispense Refill    naproxen (NAPROSYN) 500 MG tablet Take 1 tablet by mouth 2 times daily as needed for Pain 20 tablet 0    acetaminophen (TYLENOL) 500 MG tablet Take 1 tablet by mouth every 6 hours as needed for Pain 28 tablet 0    albuterol sulfate  (90 Base) MCG/ACT inhaler Inhale 2 puffs into the lungs 4 times daily as needed for Wheezing 1 Inhaler 5     No current facility-administered medications on file prior to visit. ASCVD 10-YEAR RISK SCORE  The ASCVD Risk score ([address] et al., 2013) failed to calculate for the following reasons: The 2013 ASCVD risk score is only valid for ages 36 to 78     Review of Systems  10-point ROS is negative other than HPI. Physical Exam  Based off 1997 Exam Criteria  Ht 5' 1\" (1.549 m)   Wt 160 lb (72.6 kg)   BMI 30.23 kg/m²      Constitutional:       General: He is not in acute distress. Appearance: Normal appearance. Cardiovascular:      Rate and Rhythm: Normal rate and regular rhythm. Pulses: Normal pulses. Pulmonary:      Effort: Pulmonary effort is normal. No respiratory distress. Neurological:      Mental Status: He is alert and oriented to person, place, and time. Mental status is at baseline. Musculoskeletal:  Gait:  antalgic  Lumbar spine: There is no swelling, warmth, or erythema. Range of motion is within normal limits. There is no paraspinal or spinous process tenderness. Ipsilateral and contralateral straight leg raising tests are negative. The distal neurovascular exam is grossly intact and symmetric. Hernando Hip: Examination of the right and left hip reveals intact skin. The patient demonstrates full painless range of motion with regards to flexion, abduction, internal and external rotation. There is no tenderness about the greater trochanter. There is a negative straight leg raise against resistance. Strength is 5/5 throughout all planes. R Knee: Examination of the right knee reveals intact skin. There is focal tenderness lateral joint line. The patient demonstrates full painless range of motion but painful. Strength is 5/5 throughout all planes. Ligamentous stability is grossly intact.   L Knee: Examination of the left knee reveals intact skin. There is no focal tenderness. The patient demonstrates full painless range of motion with regard to flexion and extension. Strength is 5/5 throughout all planes. Ligamentous stability is grossly intact. Imaging  Right Knee: [facility] [place]  Radiographs: X-rays ordered today reviewed of the right knee. 4 views. Standing AP, standing AP flexed, lateral, and skyline views. They do demonstrate mild to moderate lateral joint space narrowing with osteophyte formation. There is also evidence of the previous OCD lesion of the lateral femoral condyle involving at least 50%. In addition there is osteophytes within the patellofemoral joint. Assessment and Plan  Jhonny Cheng was seen today for knee pain. Diagnoses and all orders for this visit:    Right knee pain, unspecified chronicity  -     XR KNEE RIGHT (MIN 4 VIEWS); Future  -     US ARTHR/ASP/INJ MAJOR JNT/BURSA RIGHT; Future    Osteochondral defect of condyle of femur  -     US ARTHR/ASP/INJ MAJOR JNT/BURSA RIGHT; Future    Osteochondritis dissecans of right knee    Other orders  -     bupivacaine (MARCAINE) 0.25 % injection 10 mg  -     lidocaine 1 % injection 40 mg  -     triamcinolone acetonide (KENALOG-40) injection 80 mg        Patient is suffering from a significant OCD lesion of her lateral femoral condyle. She is developing osteoarthritis at least in her lateral compartment. At 35 she would like to avoid any surgery for as long as possible. We will give her an intra-articular cortisone injection today and monitor symptoms. We did discuss viscosupplementation injections, partial knee replacement, and total knee arthroplasty. We have also previously discussed cartilage restoration procedures. Before any additional injections should be performed an MRI would be appropriate to fully evaluate not only the lateral compartment but also the medial and patellofemoral for staging of osteoarthritis.     I discussed with Josselyn Larsen that her history, symptoms, signs and imaging are most consistent with knee arthritis. We reviewed the natural history of these conditions and treatment options ranging from conservative measures (rest, icing, activity modification, physical therapy, pain meds, cortisone injection) to surgical options. In terms of treatment, I recommended continuing with rest, icing, avoidance of painful activities, NSAIDs or pain meds as tolerated, and physical therapy. I discussed in detail the risks, benefits and complications of aninjection which included but are not limited to infection, skin reactions, hot swollen joint, and anaphylaxis with the patient. The patient verbalized understanding and gave informed consent for the right knee injection. The patient is placed supine on table with a bolster underneath knee. Knee prepped with Betadine/Sterile alcohol solution. A sterile 22-gauge needle was inserted into the knee and the mixture of 2ml knealog 40mg/cc, 4 mL of 1% plain lidocaine, and 4 cc .25% bupivacaine was injected under sterile technique. The needle was withdrawn and the puncture site sealed with a Band-Aid. Technique: Under sterile conditions a [employer] ultrasound unit with a variable frequency (6.0-15.0 MHz) linear transducer was used to localize the placement of a 22-gauge needle into the knee joint. Findings: Successful needle placement for knee injection. Final images were taken and saved for permanent record. The patient tolerated the injection well. The patient was instructed to call the office immediately if there is any pain, redness, warmth, fever, or chills. We discussed surgical options as well, should conservative measures fail. Electronically signed by Kimmy Eckert MD on 7/22/2021 at 8:44 AM  This dictation was generated by voice recognition computer software.   Although all attempts are made to edit the dictation for accuracy, there may be errors in the transcription that are not intended.

## 2021-10-26 ENCOUNTER — OFFICE VISIT (OUTPATIENT)
Dept: ORTHOPEDIC SURGERY | Age: 36
End: 2021-10-26
Payer: COMMERCIAL

## 2021-10-26 VITALS — HEIGHT: 61 IN | WEIGHT: 160 LBS | BODY MASS INDEX: 30.21 KG/M2

## 2021-10-26 DIAGNOSIS — M25.561 RIGHT KNEE PAIN, UNSPECIFIED CHRONICITY: Primary | ICD-10-CM

## 2021-10-26 DIAGNOSIS — M17.11 PRIMARY OSTEOARTHRITIS OF RIGHT KNEE: ICD-10-CM

## 2021-10-26 DIAGNOSIS — M95.8 OSTEOCHONDRAL DEFECT OF CONDYLE OF FEMUR: ICD-10-CM

## 2021-10-26 PROCEDURE — 1036F TOBACCO NON-USER: CPT | Performed by: ORTHOPAEDIC SURGERY

## 2021-10-26 PROCEDURE — G8427 DOCREV CUR MEDS BY ELIG CLIN: HCPCS | Performed by: ORTHOPAEDIC SURGERY

## 2021-10-26 PROCEDURE — G8484 FLU IMMUNIZE NO ADMIN: HCPCS | Performed by: ORTHOPAEDIC SURGERY

## 2021-10-26 PROCEDURE — 99213 OFFICE O/P EST LOW 20 MIN: CPT | Performed by: ORTHOPAEDIC SURGERY

## 2021-10-26 PROCEDURE — G8417 CALC BMI ABV UP PARAM F/U: HCPCS | Performed by: ORTHOPAEDIC SURGERY

## 2021-10-26 NOTE — PROGRESS NOTES
Dr Theresa Perez      Date /Time 10/26/2021       8:44 AM EDT  Name [address]             1985   Location  [facility]  MRN [id number]                Chief Complaint   Patient presents with    Follow-up     Knee Right        History of Present Illness  Vanessa [address] is a 37 y.o. female who presents with  right knee pain,. Patient presents the office today for follow-up visit. Patient did receive a right knee intra-articular cortisone injection at her last visit. She did get good relief but her pain is starting to return. Her pain is over the medial and lateral aspects of her knee. Lateral is more symptomatic. Previous history: Patient presents the office today for a new problem. Patient is here with a chief complaint of right knee pain. Patient's right knee has been painful off and on for many years. She has had symptoms for at least 7. As a child she did have surgery for an osteochondral lesion of her lateral femoral condyle. She did well for period of time. Her knee became more symptomatic approximately a week ago without new injury or trauma. It is global knee pain with swelling. She went to the emergency room and was placed into a knee immobilizer. Past History  Past Medical History:   Diagnosis Date    Asthma     Bleeding nose 13yo    s/p cauterization (left)    Migraines      Past Surgical History:   Procedure Laterality Date    ADENOIDECTOMY      KNEE SURGERY  1997    R knee    TYMPANOSTOMY TUBE PLACEMENT      child     Social History     Tobacco Use    Smoking status: Never Smoker    Smokeless tobacco: Never Used   Substance Use Topics    Alcohol use:  Yes     Alcohol/week: 1.0 standard drinks     Types: 1 Cans of beer per week     Comment: 1 beer a week       Current Outpatient Medications on File Prior to Visit   Medication Sig Dispense Refill    naproxen (NAPROSYN) 500 MG tablet Take 1 tablet by mouth 2 times daily as needed for Pain 20 tablet 0    acetaminophen (TYLENOL) 500 MG tablet Take 1 tablet by mouth every 6 hours as needed for Pain 28 tablet 0    albuterol sulfate  (90 Base) MCG/ACT inhaler Inhale 2 puffs into the lungs 4 times daily as needed for Wheezing 1 Inhaler 5     No current facility-administered medications on file prior to visit. ASCVD 10-YEAR RISK SCORE  The ASCVD Risk score (Rebekah Hair., et al., 2013) failed to calculate for the following reasons: The 2013 ASCVD risk score is only valid for ages 36 to 78     Review of Systems  10-point ROS is negative other than HPI. Physical Exam  Based off 1997 Exam Criteria  Ht 5' 1\" (1.549 m)   Wt 160 lb (72.6 kg)   BMI 30.23 kg/m²      Constitutional:       General: He is not in acute distress. Appearance: Normal appearance. Cardiovascular:      Rate and Rhythm: Normal rate and regular rhythm. Pulses: Normal pulses. Pulmonary:      Effort: Pulmonary effort is normal. No respiratory distress. Neurological:      Mental Status: He is alert and oriented to person, place, and time. Mental status is at baseline. Musculoskeletal:  Gait:  antalgic  Lumbar spine: There is no swelling, warmth, or erythema. Range of motion is within normal limits. There is no paraspinal or spinous process tenderness. Ipsilateral and contralateral straight leg raising tests are negative. The distal neurovascular exam is grossly intact and symmetric. Hernando Hip: Examination of the right and left hip reveals intact skin. The patient demonstrates full painless range of motion with regards to flexion, abduction, internal and external rotation. There is no tenderness about the greater trochanter. There is a negative straight leg raise against resistance. Strength is 5/5 throughout all planes. R Knee: Examination of the right knee reveals intact skin. There is focal tenderness lateral joint line. The patient demonstrates full painless range of motion but painful.  Strength is 5/5 throughout all planes. Ligamentous stability is grossly intact. L Knee: Examination of the left knee reveals intact skin. There is no focal tenderness. The patient demonstrates full painless range of motion with regard to flexion and extension. Strength is 5/5 throughout all planes. Ligamentous stability is grossly intact. Imaging  Right Knee: [address] [facility],[address]  Previous Radiographs: X-rays ordered today reviewed of the right knee. 4 views. Standing AP, standing AP flexed, lateral, and skyline views. They do demonstrate mild to moderate lateral joint space narrowing with osteophyte formation. There is also evidence of the previous OCD lesion of the lateral femoral condyle involving at least 50%. In addition there is osteophytes within the patellofemoral joint. Assessment and Plan  Bolivar Hill was seen today for follow-up. Diagnoses and all orders for this visit:    Right knee pain, unspecified chronicity    Osteochondral defect of condyle of femur    Primary osteoarthritis of right knee        Patient is suffering from a significant OCD lesion of her lateral femoral condyle. She is developing osteoarthritis at least in her lateral compartment. At 35 she would like to avoid any surgery for as long as possible. We have discussed repeating intra-articular cortisone injections at some point in the future if necessary. We did discuss viscosupplementation injections, partial knee replacement, and total knee arthroplasty. We have also previously discussed cartilage restoration procedures. Before any additional injections should be performed an MRI and refer her to cartilage restoration specialist.    I discussed with Skylar Valdivia that her history, symptoms, signs and imaging are most consistent with knee arthritis.     We reviewed the natural history of these conditions and treatment options ranging from conservative measures (rest, icing, activity modification, physical therapy, pain meds, cortisone injection) to surgical options. In terms of treatment, I recommended continuing with rest, icing, avoidance of painful activities, NSAIDs or pain meds as tolerated, and physical therapy. Electronically signed by Kathy Benton MD on 10/26/2021 at 4:23 PM  This dictation was generated by voice recognition computer software. Although all attempts are made to edit the dictation for accuracy, there may be errors in the transcription that are not intended.

## 2021-10-27 ENCOUNTER — TELEPHONE (OUTPATIENT)
Dept: ORTHOPEDIC SURGERY | Age: 36
End: 2021-10-27

## 2021-10-27 NOTE — TELEPHONE ENCOUNTER
CALLED PATIENT AND LEFT  STATING MRI IS APPROVED FOR [facility]. [facility] WILL CALL PATIENT TO SCHEDULE MRI. ONCE MRI IS SCHEDULED, PATIENT MAY CALL TO MAKE A FOLLOW UP APPOINTMENT IN OFFICE FOR RESULTS.

## 2021-11-29 ENCOUNTER — TELEPHONE (OUTPATIENT)
Dept: ORTHOPEDIC SURGERY | Age: 36
End: 2021-11-29

## 2021-11-29 NOTE — TELEPHONE ENCOUNTER
General Question     Subject: PATIENT TESTED POSITIVE FOR COVID FROM A HOME TEST AND WAS INQUIRING IF SHE NEEDS TO GO TO HER PCP FOR A NEGATIVE TR.  PLEASE GIVE HER A CALL  Patient: Jh Baires  Contact Number: [phone]

## 2021-12-14 ENCOUNTER — OFFICE VISIT (OUTPATIENT)
Dept: ORTHOPEDIC SURGERY | Age: 36
End: 2021-12-14
Payer: COMMERCIAL

## 2021-12-14 VITALS — WEIGHT: 160 LBS | BODY MASS INDEX: 30.21 KG/M2 | HEIGHT: 61 IN

## 2021-12-14 DIAGNOSIS — M17.11 PRIMARY OSTEOARTHRITIS OF RIGHT KNEE: ICD-10-CM

## 2021-12-14 DIAGNOSIS — M25.561 RIGHT KNEE PAIN, UNSPECIFIED CHRONICITY: Primary | ICD-10-CM

## 2021-12-14 DIAGNOSIS — M95.8 OSTEOCHONDRAL DEFECT OF CONDYLE OF FEMUR: ICD-10-CM

## 2021-12-14 PROCEDURE — 99214 OFFICE O/P EST MOD 30 MIN: CPT | Performed by: ORTHOPAEDIC SURGERY

## 2021-12-14 PROCEDURE — G8427 DOCREV CUR MEDS BY ELIG CLIN: HCPCS | Performed by: ORTHOPAEDIC SURGERY

## 2021-12-14 PROCEDURE — 1036F TOBACCO NON-USER: CPT | Performed by: ORTHOPAEDIC SURGERY

## 2021-12-14 PROCEDURE — G8484 FLU IMMUNIZE NO ADMIN: HCPCS | Performed by: ORTHOPAEDIC SURGERY

## 2021-12-14 PROCEDURE — G8417 CALC BMI ABV UP PARAM F/U: HCPCS | Performed by: ORTHOPAEDIC SURGERY

## 2021-12-14 NOTE — PROGRESS NOTES
Dr Kathy Benton      Date /Time 12/14/2021       8:44 AM EDT  Name [address]             1985   Location  [facility]  MRN <[id number]>                Chief Complaint   Patient presents with    Results     TR MRI RIGHT KNEE         History of Present Illness  Vanessa [address] Rd is a [address] y.o. female who presents with  right knee pain,. Patient presents today for MRI results. Continues to complain of pain mostly concentrated over the lateral and patellofemoral joint. Increased pain with activities and improvement with rest.  No new injury or trauma. Previous patient: Patient presents the office today for follow-up visit. Patient did receive a right knee intra-articular cortisone injection at her last visit. She did get good relief but her pain is starting to return. Her pain is over the medial and lateral aspects of her knee. Lateral is more symptomatic. Previous history: Patient presents the office today for a new problem. Patient is here with a chief complaint of right knee pain. Patient's right knee has been painful off and on for many years. She has had symptoms for at least 7. As a child she did have surgery for an osteochondral lesion of her lateral femoral condyle. She did well for period of time. Her knee became more symptomatic approximately a week ago without new injury or trauma. It is global knee pain with swelling. She went to the emergency room and was placed into a knee immobilizer. Past History  Past Medical History:   Diagnosis Date    Asthma     Bleeding nose 13yo    s/p cauterization (left)    Migraines      Past Surgical History:   Procedure Laterality Date    ADENOIDECTOMY      KNEE SURGERY  1997    R knee    TYMPANOSTOMY TUBE PLACEMENT      child     Social History     Tobacco Use    Smoking status: Never Smoker    Smokeless tobacco: Never Used   Substance Use Topics    Alcohol use:  Yes     Alcohol/week: 1.0 standard drink     Types: 1 Cans of beer per week     Comment: 1 beer a week       Current Outpatient Medications on File Prior to Visit   Medication Sig Dispense Refill    naproxen (NAPROSYN) 500 MG tablet Take 1 tablet by mouth 2 times daily as needed for Pain 20 tablet 0    acetaminophen (TYLENOL) 500 MG tablet Take 1 tablet by mouth every 6 hours as needed for Pain 28 tablet 0    albuterol sulfate  (90 Base) MCG/ACT inhaler Inhale 2 puffs into the lungs 4 times daily as needed for Wheezing 1 Inhaler 5     No current facility-administered medications on file prior to visit. ASCVD 10-YEAR RISK SCORE  The ASCVD Risk score (Miguelito Queen, et al., 2013) failed to calculate for the following reasons: The 2013 ASCVD risk score is only valid for ages 36 to 78     Review of Systems  10-point ROS is negative other than HPI. Physical Exam  Based off 1997 Exam Criteria  Ht 5' 1\" (1.549 m)   Wt 160 lb (72.6 kg)   BMI 30.23 kg/m²      Constitutional:       General: He is not in acute distress. Appearance: Normal appearance. Cardiovascular:      Rate and Rhythm: Normal rate and regular rhythm. Pulses: Normal pulses. Pulmonary:      Effort: Pulmonary effort is normal. No respiratory distress. Neurological:      Mental Status: He is alert and oriented to person, place, and time. Mental status is at baseline. Musculoskeletal:  Gait:  antalgic  Lumbar spine: There is no swelling, warmth, or erythema. Range of motion is within normal limits. There is no paraspinal or spinous process tenderness. Ipsilateral and contralateral straight leg raising tests are negative. The distal neurovascular exam is grossly intact and symmetric. Hernando Hip: Examination of the right and left hip reveals intact skin. The patient demonstrates full painless range of motion with regards to flexion, abduction, internal and external rotation. There is no tenderness about the greater trochanter. There is a negative straight leg raise against resistance. Strength is 5/5 throughout all planes. R Knee: Examination of the right knee reveals intact skin. There is focal tenderness lateral joint line. The patient demonstrates full painless range of motion but painful. Strength is 5/5 throughout all planes. Ligamentous stability is grossly intact. L Knee: Examination of the left knee reveals intact skin. There is no focal tenderness. The patient demonstrates full painless range of motion with regard to flexion and extension. Strength is 5/5 throughout all planes. Ligamentous stability is grossly intact. Imaging  Right Knee: [address] [facility],[address]  Previous Radiographs: X-rays ordered today reviewed of the right knee. 4 views. Standing AP, standing AP flexed, lateral, and skyline views. They do demonstrate mild to moderate lateral joint space narrowing with osteophyte formation. There is also evidence of the previous OCD lesion of the lateral femoral condyle involving at least 50%. In addition there is osteophytes within the patellofemoral joint. Assessment and Plan  Mica Messina was seen today for results. Diagnoses and all orders for this visit:    Right knee pain, unspecified chronicity    Osteochondral defect of condyle of femur    Primary osteoarthritis of right knee        Patient is suffering from a significant OCD lesion of her lateral femoral condyle. She is developing osteoarthritis at least in her lateral compartment. At 35 she would like to avoid any surgery for as long as possible. We have discussed repeating intra-articular cortisone injections at some point in the future if necessary. We did discuss viscosupplementation injections, partial knee replacement, and total knee arthroplasty. We have also previously discussed cartilage restoration procedures. Before any additional injections should be performed patient should see a cartilage restoration specialist and review her MRI.     I discussed with Yue Dong that her history, symptoms, signs and imaging are most consistent with knee arthritis. We reviewed the natural history of these conditions and treatment options ranging from conservative measures (rest, icing, activity modification, physical therapy, pain meds, cortisone injection) to surgical options. In terms of treatment, I recommended continuing with rest, icing, avoidance of painful activities, NSAIDs or pain meds as tolerated, and physical therapy. Electronically signed by Constance Garza MD on 12/14/2021 at 3:43 PM  This dictation was generated by voice recognition computer software. Although all attempts are made to edit the dictation for accuracy, there may be errors in the transcription that are not intended.

## 2021-12-16 ENCOUNTER — OFFICE VISIT (OUTPATIENT)
Dept: ORTHOPEDIC SURGERY | Age: 36
End: 2021-12-16
Payer: COMMERCIAL

## 2021-12-16 VITALS — WEIGHT: 160 LBS | HEIGHT: 61 IN | BODY MASS INDEX: 30.21 KG/M2

## 2021-12-16 DIAGNOSIS — M17.11 PRIMARY OSTEOARTHRITIS OF RIGHT KNEE: ICD-10-CM

## 2021-12-16 DIAGNOSIS — M22.8X1 PATELLAR MALTRACKING, RIGHT: Primary | ICD-10-CM

## 2021-12-16 DIAGNOSIS — M95.8 OSTEOCHONDRAL DEFECT OF CONDYLE OF FEMUR: ICD-10-CM

## 2021-12-16 PROCEDURE — 99214 OFFICE O/P EST MOD 30 MIN: CPT | Performed by: ORTHOPAEDIC SURGERY

## 2021-12-16 PROCEDURE — G8484 FLU IMMUNIZE NO ADMIN: HCPCS | Performed by: ORTHOPAEDIC SURGERY

## 2021-12-16 PROCEDURE — G8417 CALC BMI ABV UP PARAM F/U: HCPCS | Performed by: ORTHOPAEDIC SURGERY

## 2021-12-16 PROCEDURE — 1036F TOBACCO NON-USER: CPT | Performed by: ORTHOPAEDIC SURGERY

## 2021-12-16 PROCEDURE — G8427 DOCREV CUR MEDS BY ELIG CLIN: HCPCS | Performed by: ORTHOPAEDIC SURGERY

## 2021-12-16 RX ORDER — MELOXICAM 15 MG/1
15 TABLET ORAL DAILY PRN
Qty: 30 TABLET | Refills: 0 | Status: SHIPPED | OUTPATIENT
Start: 2021-12-16 | End: 2022-01-11

## 2021-12-16 NOTE — PROGRESS NOTES
ORTHOPAEDIC SURGERY H&P / CONSULTATION NOTE    Chief complaint:   Chief Complaint   Patient presents with    Knee Pain     NP RIGHT KNEE REFERRED BY DR Shivani Gotti        History of present illness: The patient is a 31 y.o. female  with subjective symptoms of right knee pain. The chief complaint is located at right knee anterior and lateral/medial aspect. Duration of symptoms has been for 20 to 22 years but worsening over the last 3 months. The severity of symptoms is rated at 2/10 pain on intake form. Patient states her symptoms over the last 2 to 3 weeks have dramatically improved. She is feeling better overall. She states previous history of a bone cartilage injury when she was 14 or so where she had to have surgery and this was removed. She denies actual patellar instability or dislocation events. She states slight discomfort near where the kneecap tendon inserts on the tibia. She denies significant pain going up or down stairs. She states that she has a pretty high pain threshold and she continues to enjoy playing sports. She was seen previously by Dr. Josie Kohler and was referred for surgical consultation after MRI imaging had been obtained. She states previous corticosteroid injection this past summer without significant symptom relief. The patient has tried the below listed items prior to today's consultation for above listed chief complaint.     -   Over-the-counter anti-inflammatories/prescription medication anti-inflammatory. -   Physical therapy / guided home exercise program -     +   Previous corticosteroid injections    Past medical history:    Past Medical History:   Diagnosis Date    Asthma     Bleeding nose 17yo    s/p cauterization (left)    Migraines         Past surgical history:    Past Surgical History:   Procedure Laterality Date    ADENOIDECTOMY      KNEE SURGERY  1997    R knee    TYMPANOSTOMY TUBE PLACEMENT      child        Allergies:     Allergies   Allergen Reactions    Allegra [Fexofenadine Hydrochloride]      vomiting    Amoxicillin Rash    Ceclor [Cefaclor] Nausea And Vomiting         Medications:   Current Outpatient Medications:     meloxicam (MOBIC) 15 MG tablet, Take 1 tablet by mouth daily as needed for Pain, Disp: 30 tablet, Rfl: 0    naproxen (NAPROSYN) 500 MG tablet, Take 1 tablet by mouth 2 times daily as needed for Pain, Disp: 20 tablet, Rfl: 0    acetaminophen (TYLENOL) 500 MG tablet, Take 1 tablet by mouth every 6 hours as needed for Pain, Disp: 28 tablet, Rfl: 0    albuterol sulfate  (90 Base) MCG/ACT inhaler, Inhale 2 puffs into the lungs 4 times daily as needed for Wheezing, Disp: 1 Inhaler, Rfl: 5     Social history: Denies IV drug use. Social History     Socioeconomic History    Marital status:      Spouse name: Not on file    Number of children: Not on file    Years of education: Not on file    Highest education level: Not on file   Occupational History    Not on file   Tobacco Use    Smoking status: Never Smoker    Smokeless tobacco: Never Used   Vaping Use    Vaping Use: Never used   Substance and Sexual Activity    Alcohol use: Yes     Alcohol/week: 1.0 standard drink     Types: 1 Cans of beer per week     Comment: 1 beer a week     Drug use: Yes     Types: Marijuana Earling Yudith)    Sexual activity: Yes     Partners: Male   Other Topics Concern    Not on file   Social History Narrative    Not on file     Social Determinants of Health     Financial Resource Strain:     Difficulty of Paying Living Expenses: Not on file   Food Insecurity:     Worried About Running Out of Food in the Last Year: Not on file    Ada of Food in the Last Year: Not on file   Transportation Needs:     Lack of Transportation (Medical): Not on file    Lack of Transportation (Non-Medical):  Not on file   Physical Activity:     Days of Exercise per Week: Not on file    Minutes of Exercise per Session: Not on file   Stress:     Feeling of Stress : Not on file   Social Connections:     Frequency of Communication with Friends and Family: Not on file    Frequency of Social Gatherings with Friends and Family: Not on file    Attends Buddhism Services: Not on file    Active Member of Clubs or Organizations: Not on file    Attends Club or Organization Meetings: Not on file    Marital Status: Not on file   Intimate Partner Violence:     Fear of Current or Ex-Partner: Not on file    Emotionally Abused: Not on file    Physically Abused: Not on file    Sexually Abused: Not on file   Housing Stability:     Unable to Pay for Housing in the Last Year: Not on file    Number of [place] in the Last Year: Not on file    Unstable Housing in the Last Year: Not on file     Tobacco use. Social History     Tobacco Use   Smoking Status Never Smoker   Smokeless Tobacco Never Used     Employment: Noncontributory    Workers compensation claim: Noncontributory    Review of systems: Patient denies any fevers chills chest pain shortness of breath nausea vomiting significant weight loss any change in voiding or bowel movements. Patient denies any significant numbness or tingling at baseline as it relates to this presenting symptom/chief complaint. The patient denies any significant problems with skin or any significant allergies. Physical examination:  Body mass index is 30.23 kg/m².   AAOx3, NCAT  EOMI  MMM  RR  Unlabored breathing, no wheezing  Skin intact BUE and BLE, warm and moist  Bilateral lower extremity examination specific to subjective symptoms  Exam Right Lower Extremity  Negative effusion, 0/125/0 active ROM (E/F/Lag), same P assive ROM (E/F/Lag), negative anterior Drawer, 1A Lachman,   negative posterior Drawer,  Stable varus/valgus at 0 and 30?,     none TTP Joint Line, negative Aleksander, negative Non Joint Line TTP, nontender to palpation with the knee at 90 degrees medial/lateral femoral condyle, 2 quadrant medial/lateral patellar Glide (Quadrants) with firm endpoints, negative apprehension, negative moving Apprehension , negative J Sign ,   trace squinting Patellae, negative Genu Varum, positive Genu Valgum, negative Ivonne,   positive  tight lateral retinaculum/negative tilt test     Skin intact throughout  5/5 IP Q H TA G EHL  SILT DP SP LP MP S S  +2 DP pulse    Diagnostic imaging:  MY READ:  3 view right knee 7/13/2021 and 4 view right knee 7/22/2021: Posterior lateral femoral condyle osteochondral lesion. No gross loose bodies in the right knee. Positive Osgood Vick. Positive lateral compartment arthrosis without gross findings medially. Patella is centered with relatively maintained joint space  MRI 10/26/2021 right knee: Full-thickness chondral loss inferior lateral patellar facet with corresponding trochlear chondromalacia. Positive osteochondral lesion far posterior of the lateral femoral condyle [id number]. No gross osteochondritis dissecans medial femoral condyle lesion. Medial/lateral meniscus intact. Positive chondromalacia medial and lateral compartment as well. Small tibial and femoral spurring appreciated in the medial compartment. Positive lateral tibial spurring and femoral condyle osteophyte lateral compartment. TT TG 22 mm with sulcus angle 144. Dejour A trochlea. SPE 0.27   CDI 1.35  Slight patellar tendinosis/edema and at tubercle osgood. Full-length standing alignment films 12/16/2021: Standing valgus 8 degrees    Pertinent lab work:  None       Diagnosis Orders   1. Patellar maltracking, right  meloxicam (MOBIC) 15 MG tablet    OSR PT - [facility]   2. Primary osteoarthritis of right knee  XR LEG LENGTH EVALUATION    meloxicam (MOBIC) 15 MG tablet    OSR PT - [facility]   3.  Osteochondral defect of condyle of femur  XR LEG LENGTH EVALUATION       Assessment and plan: 31 y.o. female with current subjective symptoms and physical exam findings with diagnostic imaging correlating to right knee patellar maltracking in the setting of patella alto and elevated TT TG also in the setting of genu valgum with lateral femoral condyle osteochondral lesion and tricompartmental arthrosis/chondromalacia. -Time of 30 minutes was spent coordinating and discussing the clinical findings, reviewing diagnostic imaging as indicated, coordinating care with prior notes review and current clinical encounter documentation as it pertains to the patient's presenting subjective symptoms and diagnoses. -Extensive time was taken reviewing imaging and prior notes by Dr Parris Bullock.  I reviewed with the patient the imaging findings as well as clinical exam and  how it correlates to subjective symptoms.  -I had a pleasant discussion with the patient today. I reviewed with her that her examination and diagnostic imaging is quite complex in the setting of no true history of patellar instability or dislocation event however very clearly she has had continued injury to the lateral inferior patellar facet. Her knee exam is stable with regard to ligamentous testing including patellar stability. She does have standing baseline valgus which I believe is contributed to not only her overall patellar issue with associated patellar ashish and increased TT TG to result in patellar maltracking. Her MRI also shows chondromalacia tricompartmental.  She is in standing valgus with previous posterior aspect lateral femoral condyle 17 x 13 mm osteochondral defect. I reviewed with her and consideration for nonoperative treatment options to include formal physical therapy and anti-inflammatory use. She states that her symptoms are currently doing much better and denies significant swelling as she had not denies any locking or catching symptoms.   To that end, I did review with her that it could be reasonable in the setting of the amount of chondromalacia present both patellofemoral and also lateral compartment to consider tibial tubercle osteotomy anterior medializing to address/offload the inferior lateral patellar facet disease and also distal femoral opening wedge osteotomy to address the lateral compartment disease. This could potentially negate then the need for any significant osteochondral allograft however this could still be considered for the lateral compartment and also the inferior lateral patellar facet however these offloading procedures could be of value in and of themselves. My only concern is the amount of chondromalacia which is not benign in the medial compartment and transferring said mechanical weightbearing into that compartment. I do suspect potential collagenopathy at baseline given brief review of left knee radiographs on standing weightbearing bent bilateral knees which does show also joint space narrowing and valgus knee positioning.  -To that end, she states that her symptoms are doing better and she wishes to pursue non op care understanding the risks and benefits. I would like to see how she does with maximizing conservative nonoperative treatment to include formal physical therapy and Mobic 15 mg p.o. daily. Both of these referral/prescription have been placed.  -She has tricompartmental disease and she is still significantly athletic and enjoys running and my concern also is her being able to maintain adequacy of postoperative restrictions and long-term curtailing high-impact activity to give her knee the best longevity possible if surgery were to be considered. Nonoperative management until potential total knee replacement in the future could be worthwhile and of benefit however we will discuss this in the future as needed and currently treat nonoperatively for consideration of the above listed procedures but with very clear expectations moving forward should she wish to pursue them.   -Otherwise it is encouraging that she does not have pain as she had 3 months ago which started this entire process of review  -Continue activity modification to include low impact elliptical stationary bike swimming and walking. OTC Tylenol per bottle as needed discomfort  -All questions answered to the patient's satisfaction and the patient expressed understanding and agreement with the above listed treatment plan  -Follow up in 6 to 8 weeks as needed  -Thank you for the clinical consultation and allowing me to participate in the patient's care. Electronically signed by Shiloh Gray MD on 12/16/21 at 5:33 PM EST         Shiloh Gray MD       Orthopaedic Surgery-Sports Medicine        Disclaimer: This note was dictated with voice recognition software. Though review and correction are routinely performed, please contact the office/medical records for any errors requiring correction.

## 2022-01-11 DIAGNOSIS — M22.8X1 PATELLAR MALTRACKING, RIGHT: ICD-10-CM

## 2022-01-11 DIAGNOSIS — M17.11 PRIMARY OSTEOARTHRITIS OF RIGHT KNEE: ICD-10-CM

## 2022-01-11 RX ORDER — MELOXICAM 15 MG/1
TABLET ORAL
Qty: 30 TABLET | Refills: 0 | Status: SHIPPED | OUTPATIENT
Start: 2022-01-11 | End: 2022-04-18

## 2022-04-01 ENCOUNTER — TELEMEDICINE (OUTPATIENT)
Dept: FAMILY MEDICINE CLINIC | Age: 37
End: 2022-04-01
Payer: COMMERCIAL

## 2022-04-01 DIAGNOSIS — R09.81 NASAL CONGESTION: ICD-10-CM

## 2022-04-01 DIAGNOSIS — J45.21 MILD INTERMITTENT REACTIVE AIRWAY DISEASE WITH ACUTE EXACERBATION: Primary | ICD-10-CM

## 2022-04-01 PROCEDURE — 99213 OFFICE O/P EST LOW 20 MIN: CPT | Performed by: PHYSICIAN ASSISTANT

## 2022-04-01 PROCEDURE — G8427 DOCREV CUR MEDS BY ELIG CLIN: HCPCS | Performed by: PHYSICIAN ASSISTANT

## 2022-04-01 PROCEDURE — 1036F TOBACCO NON-USER: CPT | Performed by: PHYSICIAN ASSISTANT

## 2022-04-01 PROCEDURE — G8417 CALC BMI ABV UP PARAM F/U: HCPCS | Performed by: PHYSICIAN ASSISTANT

## 2022-04-01 RX ORDER — METHYLPREDNISOLONE 4 MG/1
TABLET ORAL
Qty: 1 KIT | Refills: 0 | Status: SHIPPED | OUTPATIENT
Start: 2022-04-01 | End: 2022-04-07

## 2022-04-01 ASSESSMENT — PATIENT HEALTH QUESTIONNAIRE - PHQ9
10. IF YOU CHECKED OFF ANY PROBLEMS, HOW DIFFICULT HAVE THESE PROBLEMS MADE IT FOR YOU TO DO YOUR WORK, TAKE CARE OF THINGS AT HOME, OR GET ALONG WITH OTHER PEOPLE: 1
9. THOUGHTS THAT YOU WOULD BE BETTER OFF DEAD, OR OF HURTING YOURSELF: 0
5. POOR APPETITE OR OVEREATING: 0
8. MOVING OR SPEAKING SO SLOWLY THAT OTHER PEOPLE COULD HAVE NOTICED. OR THE OPPOSITE, BEING SO FIGETY OR RESTLESS THAT YOU HAVE BEEN MOVING AROUND A LOT MORE THAN USUAL: 0
SUM OF ALL RESPONSES TO PHQ9 QUESTIONS 1 & 2: 3
6. FEELING BAD ABOUT YOURSELF - OR THAT YOU ARE A FAILURE OR HAVE LET YOURSELF OR YOUR FAMILY DOWN: 1
3. TROUBLE FALLING OR STAYING ASLEEP: 2
7. TROUBLE CONCENTRATING ON THINGS, SUCH AS READING THE NEWSPAPER OR WATCHING TELEVISION: 0
SUM OF ALL RESPONSES TO PHQ QUESTIONS 1-9: 8
SUM OF ALL RESPONSES TO PHQ QUESTIONS 1-9: 8
4. FEELING TIRED OR HAVING LITTLE ENERGY: 2
2. FEELING DOWN, DEPRESSED OR HOPELESS: 1
SUM OF ALL RESPONSES TO PHQ QUESTIONS 1-9: 8
1. LITTLE INTEREST OR PLEASURE IN DOING THINGS: 2
SUM OF ALL RESPONSES TO PHQ QUESTIONS 1-9: 8

## 2022-04-01 ASSESSMENT — ENCOUNTER SYMPTOMS
CHEST TIGHTNESS: 1
GASTROINTESTINAL NEGATIVE: 1
SINUS PRESSURE: 1
COUGH: 1
SHORTNESS OF BREATH: 1
SINUS PAIN: 1

## 2022-04-01 NOTE — PROGRESS NOTES
2022    TELEHEALTH EVALUATION -- Audio/Visual (During BWLFR-07 public health emergency)    HPI:    Elvis Smith (:  1985) has requested an audio/video evaluation for the following concern(s):    Started 4 days ago with congestion and sinus pressure, cough and chest tightness. Is using her inhlaer a few times a day. Just started flonase. Review of Systems   HENT: Positive for congestion, sinus pressure and sinus pain. Respiratory: Positive for cough, chest tightness and shortness of breath. Gastrointestinal: Negative. Genitourinary: Negative. Prior to Visit Medications    Medication Sig Taking? Authorizing Provider   albuterol sulfate  (90 Base) MCG/ACT inhaler Inhale 2 puffs into the lungs 4 times daily as needed for Wheezing Yes CHARO Dodson   meloxicam (MOBIC) 15 MG tablet TAKE ONE TABLET BY MOUTH DAILY AS NEEDED FOR PAIN  Patient not taking: Reported on 2022  Daniela Reynolds MD   naproxen (NAPROSYN) 500 MG tablet Take 1 tablet by mouth 2 times daily as needed for Pain  Latrice See MD   acetaminophen (TYLENOL) 500 MG tablet Take 1 tablet by mouth every 6 hours as needed for Pain  Latrice See MD       Social History     Tobacco Use    Smoking status: Never Smoker    Smokeless tobacco: Never Used   Vaping Use    Vaping Use: Never used   Substance Use Topics    Alcohol use: Yes     Alcohol/week: 1.0 standard drink     Types: 1 Cans of beer per week     Comment: 1 beer a week     Drug use: Yes     Types: Marijuana (Weed)            PHYSICAL EXAMINATION:    Constitutional: [x] Appears well-developed and well-nourished [x] No apparent distress      [] Abnormal-   Mental status  [x] Alert and awake  [x] Oriented to person/place/time [x]Able to follow commands        Pulmonary/Chest: [x] Respiratory effort normal.  [x] No visualized signs of difficulty breathing or respiratory distress        [] Abnormal-        ASSESSMENT/PLAN:   Diagnosis Orders   1. Mild intermittent reactive airway disease with acute exacerbation     2. Nasal congestion       To start mucniex dm, flonase and will add on medrol dose pack. Patient is to call if no improvement or signs and symptoms worsen. Yoel Washington, was evaluated through a synchronous (real-time) audio-video encounter. The patient (or guardian if applicable) is aware that this is a billable service, which includes applicable co-pays. This Virtual Visit was conducted with patient's (and/or legal guardian's) consent. The visit was conducted pursuant to the emergency declaration under the [address] [place] [address], [address] [place] [address] [facility] authority and the [employer] Act. Patient identification was verified, and a caregiver was present when appropriate. The patient was located at home in a state where the provider was licensed to provide care. Total time spent on this encounter: 21    --CHARO Schafer on 4/1/2022 at 1:51 PM    An electronic signature was used to authenticate this note.

## 2022-04-08 ENCOUNTER — TELEPHONE (OUTPATIENT)
Dept: FAMILY MEDICINE CLINIC | Age: 37
End: 2022-04-08

## 2022-04-08 DIAGNOSIS — J01.90 ACUTE BACTERIAL SINUSITIS: Primary | ICD-10-CM

## 2022-04-08 DIAGNOSIS — B96.89 ACUTE BACTERIAL SINUSITIS: Primary | ICD-10-CM

## 2022-04-08 RX ORDER — AZITHROMYCIN 250 MG/1
TABLET, FILM COATED ORAL
Qty: 1 PACKET | Refills: 0 | Status: SHIPPED | OUTPATIENT
Start: 2022-04-08 | End: 2022-04-18

## 2022-04-08 NOTE — TELEPHONE ENCOUNTER
Patient was seen virtually on 4/1/22 for sinus congestion and was prescribed a medrol dose capo. She said she only had about 2 good days on the steroid and then as soon as she came off of it the congestion started coming back. She said on top of the sinus congestions she has ear and throat pain and had swelling in her right throat gland last night. Please advise.

## 2022-04-18 ENCOUNTER — OFFICE VISIT (OUTPATIENT)
Dept: FAMILY MEDICINE CLINIC | Age: 37
End: 2022-04-18
Payer: COMMERCIAL

## 2022-04-18 VITALS
SYSTOLIC BLOOD PRESSURE: 110 MMHG | DIASTOLIC BLOOD PRESSURE: 76 MMHG | HEIGHT: 61 IN | BODY MASS INDEX: 32.13 KG/M2 | HEART RATE: 74 BPM | OXYGEN SATURATION: 98 % | WEIGHT: 170.2 LBS

## 2022-04-18 DIAGNOSIS — Z00.00 ANNUAL PHYSICAL EXAM: Primary | ICD-10-CM

## 2022-04-18 LAB
A/G RATIO: 2.1 (ref 1.1–2.2)
ALBUMIN SERPL-MCNC: 4.9 G/DL (ref 3.4–5)
ALP BLD-CCNC: 85 U/L (ref 40–129)
ALT SERPL-CCNC: 9 U/L (ref 10–40)
ANION GAP SERPL CALCULATED.3IONS-SCNC: 12 MMOL/L (ref 3–16)
AST SERPL-CCNC: 15 U/L (ref 15–37)
BILIRUB SERPL-MCNC: 0.3 MG/DL (ref 0–1)
BUN BLDV-MCNC: 9 MG/DL (ref 7–20)
CALCIUM SERPL-MCNC: 9.4 MG/DL (ref 8.3–10.6)
CHLORIDE BLD-SCNC: 108 MMOL/L (ref 99–110)
CHOLESTEROL, TOTAL: 197 MG/DL (ref 0–199)
CO2: 24 MMOL/L (ref 21–32)
CREAT SERPL-MCNC: 0.9 MG/DL (ref 0.6–1.1)
GFR AFRICAN AMERICAN: >60
GFR NON-AFRICAN AMERICAN: >60
GLUCOSE BLD-MCNC: 99 MG/DL (ref 70–99)
HCT VFR BLD CALC: 42.6 % (ref 36–48)
HDLC SERPL-MCNC: 64 MG/DL (ref 40–60)
HEMOGLOBIN: 14.1 G/DL (ref 12–16)
HEPATITIS C ANTIBODY INTERPRETATION: NORMAL
LDL CHOLESTEROL CALCULATED: 115 MG/DL
MCH RBC QN AUTO: 32 PG (ref 26–34)
MCHC RBC AUTO-ENTMCNC: 33.2 G/DL (ref 31–36)
MCV RBC AUTO: 96.2 FL (ref 80–100)
PDW BLD-RTO: 12.4 % (ref 12.4–15.4)
PLATELET # BLD: 229 K/UL (ref 135–450)
PMV BLD AUTO: 8.3 FL (ref 5–10.5)
POTASSIUM SERPL-SCNC: 4.4 MMOL/L (ref 3.5–5.1)
RBC # BLD: 4.42 M/UL (ref 4–5.2)
SODIUM BLD-SCNC: 144 MMOL/L (ref 136–145)
T4 TOTAL: 6.3 UG/DL (ref 4.5–10.9)
TOTAL PROTEIN: 7.2 G/DL (ref 6.4–8.2)
TRIGL SERPL-MCNC: 88 MG/DL (ref 0–150)
TSH SERPL DL<=0.05 MIU/L-ACNC: 1.67 UIU/ML (ref 0.27–4.2)
VLDLC SERPL CALC-MCNC: 18 MG/DL
WBC # BLD: 7 K/UL (ref 4–11)

## 2022-04-18 PROCEDURE — 36415 COLL VENOUS BLD VENIPUNCTURE: CPT | Performed by: PHYSICIAN ASSISTANT

## 2022-04-18 PROCEDURE — 99395 PREV VISIT EST AGE 18-39: CPT | Performed by: PHYSICIAN ASSISTANT

## 2022-04-18 RX ORDER — MELOXICAM 15 MG/1
15 TABLET ORAL DAILY
Qty: 30 TABLET | Refills: 1 | Status: ON HOLD
Start: 2022-04-18 | End: 2022-07-11 | Stop reason: HOSPADM

## 2022-04-18 NOTE — PROGRESS NOTES
History and Physical      Adria Spatz  YOB: 1985    Date of Service:  4/18/2022    Chief Complaint:   Adria Spatz is a 31 y.o. female who presents for complete physical examination. HPI: Overall feeling well. Has seen ortho for knee pain, was given mobic, takes it sporadically. Feels her has been thinning since November. Wt Readings from Last 3 Encounters:   04/18/22 170 lb 3.2 oz (77.2 kg)   12/16/21 160 lb (72.6 kg)   12/14/21 160 lb (72.6 kg)     BP Readings from Last 3 Encounters:   04/18/22 110/76   07/13/21 121/73   02/14/20 108/70       There is no problem list on file for this patient.       Preventive Care:  Health Maintenance   Topic Date Due    Hepatitis C screen  Never done    COVID-19 Vaccine (1) Never done    HIV screen  Never done    Cervical cancer screen  Never done    DTaP/Tdap/Td vaccine (6 - Td or Tdap) 02/11/2021    Flu vaccine (Season Ended) 09/01/2022    Depression Screen  04/01/2023    Hepatitis B vaccine  Completed    Hib vaccine  Completed    Hepatitis A vaccine  Aged Out    Meningococcal (ACWY) vaccine  Aged Out    Pneumococcal 0-64 years Vaccine  Aged Out    Varicella vaccine  Discontinued     Hx abnormal PAP: no  Sexual activity: single partner  Self-breast exams: yes  Last eye exam:3-4 years ago  Exercise: yes  Seatbelt use:  yes  Calcium/vitamin D supplement: no  Lipid panel:   Lab Results   Component Value Date    TRIG 76 02/14/2020    HDL 59 02/14/2020    [address] 58 02/14/2020       Immunization History   Administered Date(s) Administered    DTaP 01/08/1988, 02/08/1988, 03/02/1988, 07/06/1989, 05/15/1993    Hepatitis B 08/27/2001, 09/25/2001, 08/09/2002    Hib, unspecified 07/06/1989    Influenza 02/11/2011    MMR 03/04/1989, 08/09/2000    PPD Test 02/11/2011    Polio IPV (IPOL) 01/08/1988, 03/02/1988, 07/06/1989, 05/15/1993    Td, unspecified formulation 08/09/2002    Tdap (Boostrix, Adacel) 02/11/2011       Allergies   Allergen Reactions    Allegra [Fexofenadine Hydrochloride]      vomiting    Amoxicillin Rash    Ceclor [Cefaclor] Nausea And Vomiting     Current Outpatient Medications   Medication Sig Dispense Refill    albuterol sulfate  (90 Base) MCG/ACT inhaler Inhale 2 puffs into the lungs 4 times daily as needed for Wheezing 1 Inhaler 5    acetaminophen (TYLENOL) 500 MG tablet Take 1 tablet by mouth every 6 hours as needed for Pain 28 tablet 0     No current facility-administered medications for this visit. Past Medical History:   Diagnosis Date    Asthma     Bleeding nose 13yo    s/p cauterization (left)    Migraines      Past Surgical History:   Procedure Laterality Date    ADENOIDECTOMY      KNEE SURGERY  1997    R knee    TYMPANOSTOMY TUBE PLACEMENT      child     Family History   Problem Relation Age of Onset    Diabetes Mother     Other Mother         thyroid    High Blood Pressure Mother     Diabetes Father     High Blood Pressure Father     Cancer Sister         stomach/throat unclear    Other Sister      Social History     Socioeconomic History    Marital status:      Spouse name: Not on file    Number of children: Not on file    Years of education: Not on file    Highest education level: Not on file   Occupational History    Not on file   Tobacco Use    Smoking status: Never Smoker    Smokeless tobacco: Never Used   Vaping Use    Vaping Use: Never used   Substance and Sexual Activity    Alcohol use:  Yes     Alcohol/week: 1.0 standard drink     Types: 1 Cans of beer per week     Comment: 1 beer a week     Drug use: Yes     Types: Marijuana Mary Kay Zhong)    Sexual activity: Yes     Partners: Male   Other Topics Concern    Not on file   Social History Narrative    Not on file     Social Determinants of Health     Financial Resource Strain:     Difficulty of Paying Living Expenses: Not on file   Food Insecurity:     Worried About Running Out of Food in the Last Year: Not on file   Kailee Ran Out of Food in the Last Year: Not on file   Transportation Needs:     Lack of Transportation (Medical): Not on file    Lack of Transportation (Non-Medical): Not on file   Physical Activity:     Days of Exercise per Week: Not on file    Minutes of Exercise per Session: Not on file   Stress:     Feeling of Stress : Not on file   Social Connections:     Frequency of Communication with Friends and Family: Not on file    Frequency of Social Gatherings with Friends and Family: Not on file    Attends Mu-ism Services: Not on file    Active Member of [address] [place] [address] or Organizations: Not on file    Attends Club or Organization Meetings: Not on file    Marital Status: Not on file   Intimate Partner Violence:     Fear of Current or Ex-Partner: Not on file    Emotionally Abused: Not on file    Physically Abused: Not on file    Sexually Abused: Not on file   Housing Stability:     Unable to Pay for Housing in the Last Year: Not on file    Number of [place] in the Last Year: Not on file    Unstable Housing in the Last Year: Not on file       Review of Systems:  A comprehensive review of systems was negative except for what was noted in the HPI. Physical Exam:   Vitals:    04/18/22 0933   BP: 110/76   Site: Right Upper Arm   Position: Sitting   Pulse: 74   SpO2: 98%   Weight: 170 lb 3.2 oz (77.2 kg)   Height: 5' 1\" (1.549 m)     Body mass index is 32.16 kg/m². Constitutional: She is oriented to person, place, and time. She appears well-developed and well-nourished. No distress. HEENT:   Head: Normocephalic and atraumatic. Right Ear: Tympanic membrane, external ear and ear canal normal.   Left Ear: Tympanic membrane, external ear and ear canal normal.   Nose: Nose normal.   Mouth/Throat: Oropharynx is clear and moist, and mucous membranes are normal.  There is no cervical adenopathy. Eyes: Conjunctivae and extraocular motions are normal. Pupils are equal, round, and reactive to light.    Neck: Neck supple. No JVD present. Carotid bruit is not present. No mass and no thyromegaly present. Cardiovascular: Normal rate, regular rhythm, normal heart sounds and intact distal pulses. Exam reveals no gallop and no friction rub. No murmur heard. Pulmonary/Chest: Effort normal and breath sounds normal. No respiratory distress. She has no wheezes, rhonchi or rales. Abdominal: Soft, non-tender. Bowel sounds and aorta are normal. She exhibits no organomegaly, mass or bruit. Musculoskeletal: Normal range of motion, no synovitis. She exhibits no edema. Neurological: She is alert and oriented to person, place, and time. She has normal reflexes. No cranial nerve deficit. Coordination normal.   Skin: Skin is warm and dry. There is no rash or erythema. No suspicious lesions noted. Hair does appear to be very thin. Psychiatric: She has a normal mood and affect. Her speech is normal and behavior is normal. Judgment, cognition and memory are normal.         Assessment/Plan:     Diagnosis Orders   1.  Annual physical exam  CBC    Lipid Panel    Comprehensive Metabolic Panel    TSH    T4    Hepatitis C Antibody

## 2022-05-24 ENCOUNTER — OFFICE VISIT (OUTPATIENT)
Dept: FAMILY MEDICINE CLINIC | Age: 37
End: 2022-05-24
Payer: COMMERCIAL

## 2022-05-24 ENCOUNTER — TELEPHONE (OUTPATIENT)
Dept: FAMILY MEDICINE CLINIC | Age: 37
End: 2022-05-24

## 2022-05-24 VITALS
SYSTOLIC BLOOD PRESSURE: 108 MMHG | HEIGHT: 61 IN | DIASTOLIC BLOOD PRESSURE: 68 MMHG | BODY MASS INDEX: 33.15 KG/M2 | WEIGHT: 175.6 LBS | HEART RATE: 64 BPM | OXYGEN SATURATION: 99 %

## 2022-05-24 DIAGNOSIS — R13.10 DYSPHAGIA, UNSPECIFIED TYPE: ICD-10-CM

## 2022-05-24 DIAGNOSIS — R10.32 LLQ PAIN: Primary | ICD-10-CM

## 2022-05-24 PROCEDURE — 1036F TOBACCO NON-USER: CPT | Performed by: PHYSICIAN ASSISTANT

## 2022-05-24 PROCEDURE — 99213 OFFICE O/P EST LOW 20 MIN: CPT | Performed by: PHYSICIAN ASSISTANT

## 2022-05-24 PROCEDURE — G8417 CALC BMI ABV UP PARAM F/U: HCPCS | Performed by: PHYSICIAN ASSISTANT

## 2022-05-24 PROCEDURE — G8427 DOCREV CUR MEDS BY ELIG CLIN: HCPCS | Performed by: PHYSICIAN ASSISTANT

## 2022-05-24 ASSESSMENT — ENCOUNTER SYMPTOMS
NAUSEA: 1
ABDOMINAL PAIN: 1
BLOOD IN STOOL: 0
VOMITING: 1

## 2022-05-24 NOTE — TELEPHONE ENCOUNTER
Marisabel from [facility] CT scan department called stating that Ovidio Xiomara ordered CT Abdomen Pelvis without contrast. However, usually with pt's symptoms, they would do CT with oral and IV contrast. Marisabel asked if we would modify the order. Please contact her with any questions at [phone].

## 2022-05-24 NOTE — PROGRESS NOTES
Subjective:      Patient ID: Cade Collins is a 31 y.o. female. HPI  Patient presents with left sided abdominal pain intermittently for years. Has seen GI years ago, did barium swallow which was normal. Was told her body produces more gas than most and placed on medication which she stopped when pregnant. Has tried many things over the years, pepcid,gas medication PPI's of all sorts with no real relief. The pain is severe at times, starts upper abdomen and radiates to left upper side then down to LLQ. Has tried diet changes. Some nausea and occasional emesis. She will get this type of pain at least monthly and will last 2-5 days then subside. BM's are daily but her stool consistency varies from having to strain to diarrhea. Will notice at times food gets stuck in her throat. Has no issues swallowing but mid way down will get stuck, feel chest tightenss/pain and will have to vomit to get releif. Review of Systems   Constitutional: Negative. Gastrointestinal: Positive for abdominal pain, nausea and vomiting. Negative for blood in stool. Bowel changes   Genitourinary: Negative. Objective:   Physical Exam  Constitutional:       Appearance: Normal appearance. Cardiovascular:      Rate and Rhythm: Regular rhythm. Heart sounds: Normal heart sounds. Pulmonary:      Effort: Pulmonary effort is normal.      Breath sounds: Normal breath sounds. Abdominal:      Palpations: Abdomen is soft. Tenderness: There is abdominal tenderness. There is no guarding. Comments: ttp LUQ, LLQ   Neurological:      Mental Status: She is alert and oriented to person, place, and time. Assessment / Plan:          Diagnosis Orders   1. LLQ pain  CT ABDOMEN PELVIS WO CONTRAST Additional Contrast? None    DIGNA Gaines MD, Gastroenterology (ERCP & EUS), [facility]   2.  Dysphagia, unspecified type  DIGNA Gaines MD, Gastroenterology (ERCP & EUS), [facility]

## 2022-06-01 ENCOUNTER — TELEPHONE (OUTPATIENT)
Dept: FAMILY MEDICINE CLINIC | Age: 37
End: 2022-06-01

## 2022-06-01 NOTE — TELEPHONE ENCOUNTER
Jairon from [address] [place] [address] stated that insurance required a [id number] for CT Abdomen/Pelvis that was ordered. Please contact insurance for peer to peer [phone] with tracking # [id number]. If any questions, contact Jairon at [phone].

## 2022-06-27 ENCOUNTER — HOSPITAL ENCOUNTER (OUTPATIENT)
Dept: CT IMAGING | Age: 37
Discharge: HOME | End: 2022-06-27
Payer: COMMERCIAL

## 2022-06-27 DIAGNOSIS — R10.32 LLQ PAIN: ICD-10-CM

## 2022-06-27 PROCEDURE — 6360000004 HC RX CONTRAST MEDICATION: Performed by: PHYSICIAN ASSISTANT

## 2022-06-27 PROCEDURE — 74177 CT ABD & PELVIS W/CONTRAST: CPT

## 2022-06-27 RX ADMIN — IOPAMIDOL 75 ML: 755 INJECTION, SOLUTION INTRAVENOUS at 09:18

## 2022-06-27 RX ADMIN — IOHEXOL 50 ML: 240 INJECTION, SOLUTION INTRATHECAL; INTRAVASCULAR; INTRAVENOUS; ORAL at 09:18

## 2022-07-06 RX ORDER — ACETAMINOPHEN 500 MG
500 TABLET ORAL PRN
COMMUNITY

## 2022-07-06 NOTE — PROGRESS NOTES
Obstructive Sleep Apnea (LD) Screening     Patient:  Sammy Naidu    YOB: 1985      Medical Record #:  [id number]                     Date:  7/6/2022     1. Are you a loud and/or regular snorer? []  Yes       [x] No    2. Have you been observed to gasp or stop breathing during sleep? []  Yes       [x] No    3. Do you feel tired or groggy upon awakening or do you awaken with a headache?           []  Yes       [] No    4. Are you often tired or fatigued during the wake time hours? []  Yes       [] No    5. Do you fall asleep sitting, reading, watching TV or driving? []  Yes       [] No    6. Do you often have problems with memory or concentration? []  Yes       [] No    **If patient's score is ? 3 they are considered high risk for LD. An Anesthesia provider will evaluate the patient and develop a plan of care the day of surgery. Note:  If the patient's BMI is more than 35 kg m¯² , has neck circumference > 40 cm, and/or high blood pressure the risk is greater (© American Sleep Apnea Association, 2006).

## 2022-07-06 NOTE — PROGRESS NOTES

## 2022-07-10 NOTE — H&P
79-year-old female with medical history of asthma and migraine headaches referred for chronic epigastric/left-sided abdominal pain of many years duration. Pain is sharp, intermittent, radiating to the left lower abdomen and flank.  Associated with nausea, vomiting and alternating constipation and diarrhea.  She also reports occasional solid food dysphagia with sensation of solid food especially meats stuck in the epigastrium, relieved with regurgitation.  Reports chronic Excedrin use for migraine headaches. Pain has been mildly improved with prior trials of Zantac, and Pepcid. Denies  abdominal distension, odynophagia, fever, chills,melena or hematochezia. Labs from 4/18/2022 reviewed with unremarkable CBC, BMP, liver test, TSH, T4 and lipid panel except elevated  and HDL 64. CT abdomen and pelvis on 6/27/2022 showed 2 non obstructing left intrarenal calculi measuring 3 mm and 4 mm respectively. Last EGD: None  Last colonoscopy: None    Problem List/Past Medical (Baljit Hicks; 6/29/2022 8:55 AM)  Asthma    Migraines    Problems Reconciled      Medication History Baljit Hicks; 6/29/2022 8:55 AM)  Albuterol Sulfate HFA  (108 (90 Base)MCG/ACT Aerosol Soln, Inhalation as needed) Active. Meloxicam  (15MG Tablet, Oral 1 tablet daily) Active. Medications Reconciled     Allergies (Baljit Hicks; 6/29/2022 8:56 AM)  Ceclor *CEPHALOSPORINS*   Nausea, Vomiting. Allegra *ANTIHISTAMINES*   Vomiting. Amoxicillin *PENICILLINS*   Rash. Allergies Reconciled      Social History (Baljit Hicks; 6/29/2022 8:56 AM)  Tobacco use   Never smoker. Alcohol use   Occasional alcohol use. Illicit drug use   Uses marijuana. Past Surgical History (Baljit Hicks; 6/29/2022 8:56 AM)  ADENOIDECTOMY    KNEE SURGERY    TYMPANOSTOMY TUBE PLACEMENT      Family History (Baljit Hicks; 6/29/2022 8:56 AM)  Stomach cancer   Sister.         Review of Systems Warren Herzog MD; 6/29/2022 10:53 AM)  General Not Present- Appetite reveals - No Murmurs. Abdomen  Inspection  Inspection of the abdomen reveals - No Hernias. Skin - Normal.  Palpation/Percussion  Palpation and Percussion of the abdomen reveal - Soft, Non Tender, No Rebound tenderness, No Rigidity (guarding), No Abnormal dullness to percussion and No hepatosplenomegaly. Auscultation  Auscultation of the abdomen reveals - Bowel sounds normal.    Rectal - Did not examine. Peripheral Vascular  Lower Extremity  Palpation - Tenderness - Bilateral - Non Tender. Edema - Bilateral - No edema - Bilateral.    Neurologic  Mental Status  Affect - appropriate. Speech - Normal.    Neuropsychiatric  The patient's mood and affect are described as  - normal.        Assessment & Plan Everardo Bravo MD; 6/29/2022 10:56 AM)    Epigastric pain (R10.13)  Impression: Chronic epigastric/left-sided abdominal pain. Differentials include but not limited to peptic ulcer disease in setting of chronic NSAID use, esophagitis, gastritis, duodenitis versus left-sided nephrolithiasis noted on CT. Protonix 40 mg orally daily, 30 minutes before meals. Avoid NSAIDs  EGD with biopsy  Maintain anti-reflux measures with avoidance of carbonated/acid beverages, fried and fatty foods, peppermint, chocolate, alcohol, coffee, citrus fruits/juices, tomato products. Avoid lying down for 2 to 3 hours after eating and avoid tight fitting clothing. Weight loss helps heartburn/reflux especially in presence of a hiatal hernia. Current Plans  Started Protonix 40 MG Oral Tablet Delayed Release, 1 (one) Tablet daily, #30, 30 days starting 06/29/2022, Ref. x2.  Local Order:  Patient Instructions: Take 30 minutes before meals  Future Plans  7/11/2022: EGD (46402) - one time    Dysphagia (R13.10)  Impression: Solid food dysphagia.  Differentials include but not limited to peptic stricture, eosinophilic esophagitis versus esophageal dysmotility    Plan EGD with biopsy and possible dilation  Start Protonix 40 mg orally daily    Current Plans  EGD, with biopsy (57683)  Future Plans  7/11/2022: EGD (81611) - one time    Left nephrolithiasis (N20.0)  Impression: Left nephrolithiasis on CT Possible etiology of chronic left-sided abdominal pain.   Recommend urology referral for possible stone extraction

## 2022-07-11 ENCOUNTER — ANESTHESIA EVENT (OUTPATIENT)
Dept: ENDOSCOPY | Age: 37
End: 2022-07-11
Payer: COMMERCIAL

## 2022-07-11 ENCOUNTER — ANESTHESIA (OUTPATIENT)
Dept: ENDOSCOPY | Age: 37
End: 2022-07-11
Payer: COMMERCIAL

## 2022-07-11 ENCOUNTER — HOSPITAL ENCOUNTER (OUTPATIENT)
Age: 37
Setting detail: OUTPATIENT SURGERY
Discharge: HOME | End: 2022-07-11
Attending: INTERNAL MEDICINE | Admitting: INTERNAL MEDICINE
Payer: COMMERCIAL

## 2022-07-11 VITALS
OXYGEN SATURATION: 98 % | DIASTOLIC BLOOD PRESSURE: 74 MMHG | HEART RATE: 53 BPM | WEIGHT: 170 LBS | HEIGHT: 61 IN | SYSTOLIC BLOOD PRESSURE: 116 MMHG | TEMPERATURE: 96.8 F | BODY MASS INDEX: 32.1 KG/M2 | RESPIRATION RATE: 16 BRPM

## 2022-07-11 DIAGNOSIS — R10.13 EPIGASTRIC PAIN: ICD-10-CM

## 2022-07-11 DIAGNOSIS — R13.10 DYSPHAGIA, UNSPECIFIED TYPE: ICD-10-CM

## 2022-07-11 LAB — PREGNANCY, URINE: NEGATIVE

## 2022-07-11 PROCEDURE — 2580000003 HC RX 258: Performed by: NURSE ANESTHETIST, CERTIFIED REGISTERED

## 2022-07-11 PROCEDURE — 7100000011 HC PHASE II RECOVERY - ADDTL 15 MIN: Performed by: INTERNAL MEDICINE

## 2022-07-11 PROCEDURE — 7100000010 HC PHASE II RECOVERY - FIRST 15 MIN: Performed by: INTERNAL MEDICINE

## 2022-07-11 PROCEDURE — 3700000001 HC ADD 15 MINUTES (ANESTHESIA): Performed by: INTERNAL MEDICINE

## 2022-07-11 PROCEDURE — 84703 CHORIONIC GONADOTROPIN ASSAY: CPT

## 2022-07-11 PROCEDURE — 2709999900 HC NON-CHARGEABLE SUPPLY: Performed by: INTERNAL MEDICINE

## 2022-07-11 PROCEDURE — 6360000002 HC RX W HCPCS: Performed by: NURSE ANESTHETIST, CERTIFIED REGISTERED

## 2022-07-11 PROCEDURE — 2580000003 HC RX 258: Performed by: INTERNAL MEDICINE

## 2022-07-11 PROCEDURE — 3700000000 HC ANESTHESIA ATTENDED CARE: Performed by: INTERNAL MEDICINE

## 2022-07-11 PROCEDURE — 88305 TISSUE EXAM BY PATHOLOGIST: CPT

## 2022-07-11 PROCEDURE — 3609012400 HC EGD TRANSORAL BIOPSY SINGLE/MULTIPLE: Performed by: INTERNAL MEDICINE

## 2022-07-11 PROCEDURE — 2500000003 HC RX 250 WO HCPCS: Performed by: NURSE ANESTHETIST, CERTIFIED REGISTERED

## 2022-07-11 RX ORDER — SODIUM CHLORIDE, SODIUM LACTATE, POTASSIUM CHLORIDE, CALCIUM CHLORIDE 600; 310; 30; 20 MG/100ML; MG/100ML; MG/100ML; MG/100ML
INJECTION, SOLUTION INTRAVENOUS CONTINUOUS PRN
Status: DISCONTINUED | OUTPATIENT
Start: 2022-07-11 | End: 2022-07-11 | Stop reason: SDUPTHER

## 2022-07-11 RX ORDER — LIDOCAINE HYDROCHLORIDE 20 MG/ML
INJECTION, SOLUTION INFILTRATION; PERINEURAL PRN
Status: DISCONTINUED | OUTPATIENT
Start: 2022-07-11 | End: 2022-07-11 | Stop reason: SDUPTHER

## 2022-07-11 RX ORDER — LIDOCAINE HYDROCHLORIDE 10 MG/ML
0.1 INJECTION, SOLUTION EPIDURAL; INFILTRATION; INTRACAUDAL; PERINEURAL
Status: DISCONTINUED | OUTPATIENT
Start: 2022-07-11 | End: 2022-07-11 | Stop reason: HOSPADM

## 2022-07-11 RX ORDER — SODIUM CHLORIDE, SODIUM LACTATE, POTASSIUM CHLORIDE, CALCIUM CHLORIDE 600; 310; 30; 20 MG/100ML; MG/100ML; MG/100ML; MG/100ML
INJECTION, SOLUTION INTRAVENOUS ONCE
Status: COMPLETED | OUTPATIENT
Start: 2022-07-11 | End: 2022-07-11

## 2022-07-11 RX ORDER — PROPOFOL 10 MG/ML
INJECTION, EMULSION INTRAVENOUS PRN
Status: DISCONTINUED | OUTPATIENT
Start: 2022-07-11 | End: 2022-07-11 | Stop reason: SDUPTHER

## 2022-07-11 RX ADMIN — PROPOFOL 50 MG: 10 INJECTION, EMULSION INTRAVENOUS at 11:10

## 2022-07-11 RX ADMIN — SODIUM CHLORIDE, SODIUM LACTATE, POTASSIUM CHLORIDE, AND CALCIUM CHLORIDE: .6; .31; .03; .02 INJECTION, SOLUTION INTRAVENOUS at 11:04

## 2022-07-11 RX ADMIN — PROPOFOL 40 MG: 10 INJECTION, EMULSION INTRAVENOUS at 11:15

## 2022-07-11 RX ADMIN — PROPOFOL 100 MG: 10 INJECTION, EMULSION INTRAVENOUS at 11:09

## 2022-07-11 RX ADMIN — PROPOFOL 40 MG: 10 INJECTION, EMULSION INTRAVENOUS at 11:13

## 2022-07-11 RX ADMIN — PROPOFOL 50 MG: 10 INJECTION, EMULSION INTRAVENOUS at 11:11

## 2022-07-11 RX ADMIN — SODIUM CHLORIDE, POTASSIUM CHLORIDE, SODIUM LACTATE AND CALCIUM CHLORIDE: 600; 310; 30; 20 INJECTION, SOLUTION INTRAVENOUS at 10:44

## 2022-07-11 RX ADMIN — LIDOCAINE HYDROCHLORIDE 60 MG: 20 INJECTION, SOLUTION INFILTRATION; PERINEURAL at 11:09

## 2022-07-11 NOTE — OP NOTE
[address] [place] [address] [address],  [address], [address]  Phone: [phone]  [address] [place] [address]  Phone: [phone]   OAY:[phone]    EGD Procedure Note    Patient: Azra Ascencio  : 1985    Procedure: Esophagogastroduodenoscopy with biopsy    Date:  2022     Endoscopist:  Lacy Murray MD    Referring Physician:  CHARO Celeste    Preoperative Diagnosis:  Dysphagia, unspecified type [R13.10]  Epigastric pain [R10.13]    Postoperative Diagnosis: Gastritis, hiatal hernia    Anesthesia: Anesthesia: MAC  Sedation: Propofol per anesthesia  Start Time: 1112  Stop Time: 111  ASA Class: 2  Mallampati: II (soft palate, uvula, fauces visible)    Indications: This is a 31y.o. year old female with medical history of asthma and migraine headaches referred for chronic epigastric/left-sided abdominal pain of many years duration.  Associated with nausea, vomiting and alternating constipation and diarrhea.  She also reports occasional solid food dysphagia with sensation of solid food especially meats stuck in the epigastrium, relieved with regurgitation.      Procedure Details  Informed consent was obtained for the procedure, including conscious sedation. Risks of pancreatitis, infection, perforation, hemorrhage, adverse drug reaction and aspiration were discussed. The patient was placed in the left lateral decubitus position. Based on the pre-procedure assessment, including review of the patient's medical history, medications, allergies, and review of systems, she had been deemed to be an appropriate candidate for the above IV sedation; she was therefore sedated with the medications listed above. She was monitored continuously with ECG tracing, pulse oximetry, blood pressure monitoring, and direct observation. A gastroscope was inserted into the mouth and advanced under direct vision to second portion of the duodenum.   A careful inspection was made as the gastroscope was withdrawn, including a retroflexed view of the proximal stomach including views of the incisura and cardia; findings and interventions are described below. Appropriate photodocumentation Was Obtained. If photos taken, they were ordered to be scanned into the medical record. Findings:  Normal upper and mid esophagus. Biopsies taken from the mid and distal esophagus to evaluate for eosinophilic esophagitis. Irregular Z-line at 33 cm from incisors  A 2 cm sliding hiatal hernia. Mild erythematous mucosa in antrum and body of stomach suggestive of mild gastritis. Biopsies taken from antrum, incisura and body of stomach to evaluate for H. Pylori. Normal duodenal bulb and second portion of duodenum. Biopsies taken to evaluate for Celiac disease. Specimens: Was Obtained:     Complications:   None; patient tolerated the procedure well. Disposition:   PACU - hemodynamically stable. Estimated Blood loss:  none    Impression:   Normal upper and mid esophagus. Biopsied   Irregular Z-line at 33 cm from incisors  A 2 cm sliding hiatal hernia. Mild gastritis. Biopsied  Normal duodenal bulb and second portion of duodenum. Biopsied      Recommendations:  -Continue Protonix 40 mg orally daily  -Continue antireflux measures.  -Await pathology. ,   -Follow symptoms. ,   -Follow-up with me in 6 to 8 weeks        Dottie Ruano MD   [address]  7/11/22 11:18 AM EDT    Please note that some or all of this record was generated using voice recognition software. If there are any questions about the content of this document, please contact the author as some errors in translation may have occurred.

## 2022-07-11 NOTE — ANESTHESIA POSTPROCEDURE EVALUATION
Department of Anesthesiology  Postprocedure Note    Patient: Patricia Sprague  MRN: [id number]  YOB: 1985  Date of evaluation: 7/11/2022      Procedure Summary     Date: 07/11/22 Room / Location: [facility] [address] [place] [address]    Anesthesia Start: 1104 Anesthesia Stop: 5411    Procedure: EGD BIOPSY (N/A ) Diagnosis:       Dysphagia, unspecified type      Epigastric pain      (DYSPHAGIA; EPIGASTRIC PAIN)    Surgeons: Todd Ramsay MD Responsible Provider: Ruchi Moreno MD    Anesthesia Type: MAC ASA Status: 2          Anesthesia Type: No value filed.     Burt Phase I: Burt Score: 10    Burt Phase II: Burt Score: 9      Anesthesia Post Evaluation    Patient location during evaluation: PACU  Patient participation: complete - patient participated  Level of consciousness: awake and alert  Airway patency: patent  Nausea & Vomiting: no nausea and no vomiting  Complications: no  Cardiovascular status: hemodynamically stable  Respiratory status: acceptable, room air and spontaneous ventilation  Hydration status: stable

## 2022-07-11 NOTE — ANESTHESIA PRE PROCEDURE
Department of Anesthesiology  Preprocedure Note       Name:  Tereso Mcclure   Age:  31 y.o.  :  1985                                          MRN:  [id number]         Date:  2022      Surgeon: Brigido Ray):  Windy Gorman MD    Procedure: Procedure(s):  ESOPHAGOGASTRODUODENOSCOPY    Medications prior to admission:   Prior to Admission medications    Medication Sig Start Date End Date Taking? Authorizing Provider   acetaminophen (TYLENOL) 500 MG tablet Take 500 mg by mouth as needed for Pain   Yes Historical Provider, MD   meloxicam (MOBIC) 15 MG tablet Take 1 tablet by mouth daily 22   CHARO Schrader   albuterol sulfate  (90 Base) MCG/ACT inhaler Inhale 2 puffs into the lungs 4 times daily as needed for Wheezing 19   CHARO Zamudio       Current medications:    Current Facility-Administered Medications   Medication Dose Route Frequency Provider Last Rate Last Admin    lactated ringers infusion   IntraVENous Once Windy Gorman MD        lidocaine PF 1 % injection 0.1 mL  0.1 mL IntraDERmal Once PRN Windy Gorman MD           Allergies: Allergies   Allergen Reactions    Allegra [Fexofenadine Hydrochloride]      vomiting    Amoxicillin Rash    Ceclor [Cefaclor] Nausea And Vomiting       Problem List:  There is no problem list on file for this patient. Past Medical History:        Diagnosis Date    Arthritis     rheumatoid    Asthma     Bleeding nose 15yo    s/p cauterization (left)    Migraines        Past Surgical History:        Procedure Laterality Date    ADENOIDECTOMY      CAUTERIZE INNER NOSE      KNEE SURGERY      R knee    TYMPANOSTOMY TUBE PLACEMENT      child       Social History:    Social History     Tobacco Use    Smoking status: Never Smoker    Smokeless tobacco: Never Used   Substance Use Topics    Alcohol use:  Yes     Alcohol/week: 1.0 standard drink     Types: 1 Cans of beer per week                                Counseling given: Not Answered      Vital Signs (Current):   Vitals:    07/06/22 1328   Weight: 170 lb (77.1 kg)   Height: 5' 1\" (1.549 m)                                              BP Readings from Last 3 Encounters:   05/24/22 108/68   04/18/22 110/76   07/13/21 121/73       NPO Status:                                                                                 BMI:   Wt Readings from Last 3 Encounters:   07/06/22 170 lb (77.1 kg)   05/24/22 175 lb 9.6 oz (79.7 kg)   04/18/22 170 lb 3.2 oz (77.2 kg)     Body mass index is 32.12 kg/m². CBC:   Lab Results   Component Value Date/Time    WBC 7.0 04/18/2022 10:01 AM    RBC 4.42 04/18/2022 10:01 AM    HGB 14.1 04/18/2022 10:01 AM    HCT 42.6 04/18/2022 10:01 AM    MCV 96.2 04/18/2022 10:01 AM    RDW 12.4 04/18/2022 10:01 AM     04/18/2022 10:01 AM       CMP:   Lab Results   Component Value Date/Time     04/18/2022 10:01 AM    K 4.4 04/18/2022 10:01 AM     04/18/2022 10:01 AM    CO2 24 04/18/2022 10:01 AM    BUN 9 04/18/2022 10:01 AM    CREATININE 0.9 04/18/2022 10:01 AM    GFRAA >60 04/18/2022 10:01 AM    GFRAA >60 09/20/2010 06:05 AM    AGRATIO 2.1 04/18/2022 10:01 AM    LABGLOM >60 04/18/2022 10:01 AM    GLUCOSE 99 04/18/2022 10:01 AM    PROT 7.2 04/18/2022 10:01 AM    PROT 6.2 09/19/2010 02:57 AM    CALCIUM 9.4 04/18/2022 10:01 AM    BILITOT 0.3 04/18/2022 10:01 AM    ALKPHOS 85 04/18/2022 10:01 AM    AST 15 04/18/2022 10:01 AM    ALT 9 04/18/2022 10:01 AM       POC Tests: No results for input(s): POCGLU, POCNA, POCK, POCCL, POCBUN, POCHEMO, POCHCT in the last 72 hours.     Coags:   Lab Results   Component Value Date/Time    PROTIME 12.0 09/19/2010 02:57 AM    INR 1.05 09/19/2010 02:57 AM    APTT 26.6 09/19/2010 02:57 AM       HCG (If Applicable):   Lab Results   Component Value Date    PREGTESTUR Negative 09/13/2019        ABGs: No results found for: PHART, PO2ART, FGI1IQC, GDB7JKI, BEART, F3PTTMTX     Type & Screen (If Applicable):  No results found for: LABABO, LABRH    Drug/Infectious Status (If Applicable):  No results found for: HIV, HEPCAB    COVID-19 Screening (If Applicable): No results found for: COVID19        Anesthesia Evaluation  Patient summary reviewed  Airway: Mallampati: I  TM distance: >3 FB   Neck ROM: full  Mouth opening: > = 3 FB   Dental: normal exam         Pulmonary:normal exam  breath sounds clear to auscultation  (+) asthma (uses inhaler occasionally):                            Cardiovascular:Negative CV ROS  Exercise tolerance: good (>4 METS),           Rhythm: regular  Rate: normal                    Neuro/Psych:   (+) headaches: migraine headaches,             GI/Hepatic/Renal: Neg GI/Hepatic/Renal ROS            Endo/Other:                      ROS comment: BMI 32 Abdominal:             Vascular: Other Findings:           Anesthesia Plan      MAC     ASA 2       Induction: intravenous. Anesthetic plan and risks discussed with patient. Plan discussed with CRNA.                     Prince Barrett MD   7/11/2022

## 2022-09-12 ENCOUNTER — TELEMEDICINE (OUTPATIENT)
Dept: FAMILY MEDICINE CLINIC | Age: 37
End: 2022-09-12
Payer: COMMERCIAL

## 2022-09-12 ENCOUNTER — TELEPHONE (OUTPATIENT)
Dept: FAMILY MEDICINE CLINIC | Age: 37
End: 2022-09-12

## 2022-09-12 ENCOUNTER — NURSE TRIAGE (OUTPATIENT)
Dept: OTHER | Facility: CLINIC | Age: 37
End: 2022-09-12

## 2022-09-12 DIAGNOSIS — J02.9 SORE THROAT: ICD-10-CM

## 2022-09-12 DIAGNOSIS — Z20.822 SUSPECTED COVID-19 VIRUS INFECTION: Primary | ICD-10-CM

## 2022-09-12 PROCEDURE — 1036F TOBACCO NON-USER: CPT | Performed by: PHYSICIAN ASSISTANT

## 2022-09-12 PROCEDURE — 99213 OFFICE O/P EST LOW 20 MIN: CPT | Performed by: PHYSICIAN ASSISTANT

## 2022-09-12 PROCEDURE — G8427 DOCREV CUR MEDS BY ELIG CLIN: HCPCS | Performed by: PHYSICIAN ASSISTANT

## 2022-09-12 PROCEDURE — G8417 CALC BMI ABV UP PARAM F/U: HCPCS | Performed by: PHYSICIAN ASSISTANT

## 2022-09-12 RX ORDER — PANTOPRAZOLE SODIUM 40 MG/1
TABLET, DELAYED RELEASE ORAL
COMMUNITY
Start: 2022-06-29

## 2022-09-12 ASSESSMENT — ENCOUNTER SYMPTOMS
CHEST TIGHTNESS: 1
COUGH: 1

## 2022-09-12 NOTE — TELEPHONE ENCOUNTER
----- Message from Kaycee Gupta sent at 9/12/2022  4:21 PM EDT -----  Subject: Message to Provider    QUESTIONS  Information for Provider? Patient called states that she just had a   virtual visit with Charli Wright and was told to call back if the covid swab   was negative which it is and to get an order placed for a throat swab. She   asked for a return call.   ---------------------------------------------------------------------------  --------------  Jan CROWE  [phone]; OK to leave message on voicemail  ---------------------------------------------------------------------------  --------------  SCRIPT ANSWERS  Relationship to Patient?  Self

## 2022-09-12 NOTE — TELEPHONE ENCOUNTER
Received call from Jose Chong at [facility] with Red Flag Complaint. Subjective: Caller states \"cough with some shortness of breath\"     Current Symptoms: some off/on wheezing-inhaler helps. Mildly productive cough-green, something it's dry and irritated. Shortness of breath with coughing fit or really active. Some off/on diarrhea. No n/v. Has asthma, no heart issues or blood clots. Covid exposure last week. Onset: 4 days ago; unchanged    Associated Symptoms: NA    Pain Severity: mild off/on in back or chest with cough only    What has been tried: inhaler    LMP:  no date given  Pregnant: No    Recommended disposition: Go to [facility]    Care advice provided, patient verbalizes understanding; denies any other questions or concerns; instructed to call back for any new or worsening symptoms. Patient/Caller agrees with recommended disposition; writer provided warm transfer to Jeanne Mcleod at [facility] for appointment scheduling     Attention Provider: Thank you for allowing me to participate in the care of your patient. The patient was connected to triage in response to information provided to the ECC/PSC. Please do not respond through this encounter as the response is not directed to a shared pool.     Reason for Disposition   MILD difficulty breathing (e.g., minimal/no SOB at rest, SOB with walking, pulse <100) and still present when not coughing    Protocols used: Cough-ADULT-[place]

## 2022-09-12 NOTE — PROGRESS NOTES
if positive. If negative and symptoms not improving may need strep test.       Mariely Mccrary, was evaluated through a synchronous (real-time) audio-video encounter. The patient (or guardian if applicable) is aware that this is a billable service, which includes applicable co-pays. This Virtual Visit was conducted with patient's (and/or legal guardian's) consent. The visit was conducted pursuant to the emergency declaration under the [address] [place] [address], [address] [place] [address] and the [employer] Act. Patient identification was verified, and a caregiver was present when appropriate. The patient was located at Home: [address] [place] [address] [address]. Provider was located at [place] [address]): [address] [place] [address] [facility]. Total time spent on this encounter:  21    --CHARO Zafar on 9/12/2022 at 2:48 PM    An electronic signature was used to authenticate this note.

## 2022-09-14 NOTE — TELEPHONE ENCOUNTER
I spoke with the pt. She states that she went to an urgent care yesterday morning and was told she has a sinus infection, strep throat, and earache. They prescribed her Augmentin. She states that she is starting to feel better.

## 2023-08-23 ENCOUNTER — APPOINTMENT (OUTPATIENT)
Dept: CT IMAGING | Age: 38
End: 2023-08-23
Attending: EMERGENCY MEDICINE
Payer: COMMERCIAL

## 2023-08-23 ENCOUNTER — HOSPITAL ENCOUNTER (EMERGENCY)
Age: 38
Discharge: HOME | End: 2023-08-23
Attending: EMERGENCY MEDICINE
Payer: COMMERCIAL

## 2023-08-23 VITALS
HEIGHT: 61 IN | RESPIRATION RATE: 14 BRPM | OXYGEN SATURATION: 99 % | SYSTOLIC BLOOD PRESSURE: 136 MMHG | WEIGHT: 165 LBS | DIASTOLIC BLOOD PRESSURE: 68 MMHG | TEMPERATURE: 98.4 F | BODY MASS INDEX: 31.15 KG/M2 | HEART RATE: 78 BPM

## 2023-08-23 DIAGNOSIS — N20.1 URETEROLITHIASIS: Primary | ICD-10-CM

## 2023-08-23 LAB
ALBUMIN SERPL-MCNC: 4.4 G/DL (ref 3.4–5)
ALBUMIN/GLOB SERPL: 1.6 {RATIO} (ref 1.1–2.2)
ALP SERPL-CCNC: 88 U/L (ref 40–129)
ALT SERPL-CCNC: 9 U/L (ref 10–40)
AMORPH SED URNS QL MICRO: ABNORMAL /HPF
ANION GAP SERPL CALCULATED.3IONS-SCNC: 10 MMOL/L (ref 3–16)
AST SERPL-CCNC: 15 U/L (ref 15–37)
BACTERIA URNS QL MICRO: ABNORMAL /HPF
BASOPHILS # BLD: 0.1 K/UL (ref 0–0.2)
BASOPHILS NFR BLD: 0.8 %
BILIRUB SERPL-MCNC: 0.3 MG/DL (ref 0–1)
BILIRUB UR QL STRIP.AUTO: ABNORMAL
BUN SERPL-MCNC: 11 MG/DL (ref 7–20)
CALCIUM SERPL-MCNC: 8.8 MG/DL (ref 8.3–10.6)
CHLORIDE SERPL-SCNC: 106 MMOL/L (ref 99–110)
CLARITY UR: CLEAR
CO2 SERPL-SCNC: 24 MMOL/L (ref 21–32)
COLOR UR: YELLOW
CREAT SERPL-MCNC: 1 MG/DL (ref 0.6–1.1)
DEPRECATED RDW RBC AUTO: 12.2 % (ref 12.4–15.4)
EOSINOPHIL # BLD: 0.6 K/UL (ref 0–0.6)
EOSINOPHIL NFR BLD: 6.1 %
EPI CELLS #/AREA URNS HPF: ABNORMAL /HPF (ref 0–5)
GFR SERPLBLD CREATININE-BSD FMLA CKD-EPI: >60 ML/MIN/{1.73_M2}
GLUCOSE SERPL-MCNC: 141 MG/DL (ref 70–99)
GLUCOSE UR STRIP.AUTO-MCNC: NEGATIVE MG/DL
HCG SERPL QL: NEGATIVE
HCT VFR BLD AUTO: 41.4 % (ref 36–48)
HGB BLD-MCNC: 14.3 G/DL (ref 12–16)
HGB UR QL STRIP.AUTO: ABNORMAL
KETONES UR STRIP.AUTO-MCNC: ABNORMAL MG/DL
LEUKOCYTE ESTERASE UR QL STRIP.AUTO: NEGATIVE
LIPASE SERPL-CCNC: 43 U/L (ref 13–60)
LYMPHOCYTES # BLD: 3.7 K/UL (ref 1–5.1)
LYMPHOCYTES NFR BLD: 38 %
MCH RBC QN AUTO: 33 PG (ref 26–34)
MCHC RBC AUTO-ENTMCNC: 34.5 G/DL (ref 31–36)
MCV RBC AUTO: 95.5 FL (ref 80–100)
MONOCYTES # BLD: 0.8 K/UL (ref 0–1.3)
MONOCYTES NFR BLD: 8.2 %
MUCOUS THREADS #/AREA URNS LPF: ABNORMAL /LPF
NEUTROPHILS # BLD: 4.5 K/UL (ref 1.7–7.7)
NEUTROPHILS NFR BLD: 46.9 %
NITRITE UR QL STRIP.AUTO: NEGATIVE
PH UR STRIP.AUTO: 6 [PH] (ref 5–8)
PLATELET # BLD AUTO: 263 K/UL (ref 135–450)
PMV BLD AUTO: 8.4 FL (ref 5–10.5)
POTASSIUM SERPL-SCNC: 3.7 MMOL/L (ref 3.5–5.1)
PROT SERPL-MCNC: 7.1 G/DL (ref 6.4–8.2)
PROT UR STRIP.AUTO-MCNC: 30 MG/DL
RBC # BLD AUTO: 4.34 M/UL (ref 4–5.2)
RBC #/AREA URNS HPF: ABNORMAL /HPF (ref 0–4)
SODIUM SERPL-SCNC: 140 MMOL/L (ref 136–145)
SP GR UR STRIP.AUTO: >=1.03 (ref 1–1.03)
UA COMPLETE W REFLEX CULTURE PNL UR: ABNORMAL
UA DIPSTICK W REFLEX MICRO PNL UR: YES
URN SPEC COLLECT METH UR: ABNORMAL
UROBILINOGEN UR STRIP-ACNC: 0.2 E.U./DL
WBC # BLD AUTO: 9.6 K/UL (ref 4–11)
WBC #/AREA URNS HPF: ABNORMAL /HPF (ref 0–5)

## 2023-08-23 PROCEDURE — 6370000000 HC RX 637 (ALT 250 FOR IP): Performed by: EMERGENCY MEDICINE

## 2023-08-23 PROCEDURE — 6360000002 HC RX W HCPCS: Performed by: EMERGENCY MEDICINE

## 2023-08-23 PROCEDURE — 83690 ASSAY OF LIPASE: CPT

## 2023-08-23 PROCEDURE — 96375 TX/PRO/DX INJ NEW DRUG ADDON: CPT

## 2023-08-23 PROCEDURE — 2580000003 HC RX 258: Performed by: EMERGENCY MEDICINE

## 2023-08-23 PROCEDURE — 85025 COMPLETE CBC W/AUTO DIFF WBC: CPT

## 2023-08-23 PROCEDURE — 99284 EMERGENCY DEPT VISIT MOD MDM: CPT

## 2023-08-23 PROCEDURE — 80053 COMPREHEN METABOLIC PANEL: CPT

## 2023-08-23 PROCEDURE — 84703 CHORIONIC GONADOTROPIN ASSAY: CPT

## 2023-08-23 PROCEDURE — 81001 URINALYSIS AUTO W/SCOPE: CPT

## 2023-08-23 PROCEDURE — 96376 TX/PRO/DX INJ SAME DRUG ADON: CPT

## 2023-08-23 PROCEDURE — 74176 CT ABD & PELVIS W/O CONTRAST: CPT

## 2023-08-23 PROCEDURE — 96374 THER/PROPH/DIAG INJ IV PUSH: CPT

## 2023-08-23 RX ORDER — MORPHINE SULFATE 4 MG/ML
4 INJECTION, SOLUTION INTRAMUSCULAR; INTRAVENOUS ONCE
Status: COMPLETED | OUTPATIENT
Start: 2023-08-23 | End: 2023-08-23

## 2023-08-23 RX ORDER — IBUPROFEN 600 MG/1
600 TABLET ORAL EVERY 6 HOURS PRN
Qty: 40 TABLET | Refills: 0 | Status: SHIPPED | OUTPATIENT
Start: 2023-08-23

## 2023-08-23 RX ORDER — KETOROLAC TROMETHAMINE 30 MG/ML
30 INJECTION, SOLUTION INTRAMUSCULAR; INTRAVENOUS ONCE
Status: COMPLETED | OUTPATIENT
Start: 2023-08-23 | End: 2023-08-23

## 2023-08-23 RX ORDER — TAMSULOSIN HYDROCHLORIDE 0.4 MG/1
0.4 CAPSULE ORAL DAILY
Qty: 10 CAPSULE | Refills: 0 | Status: SHIPPED | OUTPATIENT
Start: 2023-08-23

## 2023-08-23 RX ORDER — HYDROCODONE BITARTRATE AND ACETAMINOPHEN 5; 325 MG/1; MG/1
1 TABLET ORAL EVERY 6 HOURS PRN
Qty: 10 TABLET | Refills: 0 | Status: SHIPPED | OUTPATIENT
Start: 2023-08-23 | End: 2023-08-26

## 2023-08-23 RX ORDER — 0.9 % SODIUM CHLORIDE 0.9 %
1000 INTRAVENOUS SOLUTION INTRAVENOUS ONCE
Status: COMPLETED | OUTPATIENT
Start: 2023-08-23 | End: 2023-08-23

## 2023-08-23 RX ORDER — HYDROCODONE BITARTRATE AND ACETAMINOPHEN 5; 325 MG/1; MG/1
1 TABLET ORAL ONCE
Status: COMPLETED | OUTPATIENT
Start: 2023-08-23 | End: 2023-08-23

## 2023-08-23 RX ORDER — ONDANSETRON 4 MG/1
4 TABLET, FILM COATED ORAL 3 TIMES DAILY PRN
Qty: 15 TABLET | Refills: 0 | Status: SHIPPED | OUTPATIENT
Start: 2023-08-23

## 2023-08-23 RX ORDER — ONDANSETRON 2 MG/ML
4 INJECTION INTRAMUSCULAR; INTRAVENOUS ONCE
Status: COMPLETED | OUTPATIENT
Start: 2023-08-23 | End: 2023-08-23

## 2023-08-23 RX ADMIN — HYDROCODONE BITARTRATE AND ACETAMINOPHEN 1 TABLET: 5; 325 TABLET ORAL at 09:33

## 2023-08-23 RX ADMIN — ONDANSETRON 4 MG: 2 INJECTION INTRAMUSCULAR; INTRAVENOUS at 07:30

## 2023-08-23 RX ADMIN — KETOROLAC TROMETHAMINE 30 MG: 30 INJECTION, SOLUTION INTRAMUSCULAR; INTRAVENOUS at 07:57

## 2023-08-23 RX ADMIN — SODIUM CHLORIDE 1000 ML: 9 INJECTION, SOLUTION INTRAVENOUS at 07:33

## 2023-08-23 RX ADMIN — MORPHINE SULFATE 4 MG: 4 INJECTION, SOLUTION INTRAMUSCULAR; INTRAVENOUS at 09:33

## 2023-08-23 RX ADMIN — MORPHINE SULFATE 4 MG: 4 INJECTION, SOLUTION INTRAMUSCULAR; INTRAVENOUS at 07:30

## 2023-08-23 ASSESSMENT — PAIN DESCRIPTION - ORIENTATION: ORIENTATION: RIGHT

## 2023-08-23 ASSESSMENT — PAIN DESCRIPTION - DESCRIPTORS: DESCRIPTORS: SHARP;SHOOTING;STABBING

## 2023-08-23 ASSESSMENT — PAIN - FUNCTIONAL ASSESSMENT: PAIN_FUNCTIONAL_ASSESSMENT: 0-10

## 2023-08-23 ASSESSMENT — ENCOUNTER SYMPTOMS
DIARRHEA: 1
ABDOMINAL PAIN: 1
NAUSEA: 1
VOMITING: 1

## 2023-08-23 ASSESSMENT — PAIN SCALES - GENERAL
PAINLEVEL_OUTOF10: 8
PAINLEVEL_OUTOF10: 9
PAINLEVEL_OUTOF10: 10

## 2023-08-23 ASSESSMENT — PAIN DESCRIPTION - PAIN TYPE: TYPE: ACUTE PAIN

## 2023-08-23 ASSESSMENT — PAIN DESCRIPTION - LOCATION: LOCATION: ABDOMEN;FLANK

## (undated) DEVICE — CONMED SCOPE SAVER BITE BLOCK, 20X27 MM: Brand: SCOPE SAVER

## (undated) DEVICE — CANNULA NSL AD TBNG L7FT PVC STR NONFLARED PRNG O2 DEL W STD

## (undated) DEVICE — ELECTRODE,ECG,STRESS,FOAM,3PK: Brand: MEDLINE

## (undated) DEVICE — FORCEPS BX L240CM JAW DIA2.8MM L CAP W/ NDL MIC MESH TOOTH

## (undated) DEVICE — ENDOSCOPIC KIT 2 12 FT OP4 DE2 GWN SYR